# Patient Record
Sex: MALE | Race: BLACK OR AFRICAN AMERICAN | NOT HISPANIC OR LATINO | Employment: FULL TIME | URBAN - METROPOLITAN AREA
[De-identification: names, ages, dates, MRNs, and addresses within clinical notes are randomized per-mention and may not be internally consistent; named-entity substitution may affect disease eponyms.]

---

## 2017-04-05 ENCOUNTER — OFFICE VISIT (OUTPATIENT)
Dept: INTERNAL MEDICINE | Facility: CLINIC | Age: 27
End: 2017-04-05

## 2017-04-05 VITALS
SYSTOLIC BLOOD PRESSURE: 138 MMHG | HEART RATE: 74 BPM | HEIGHT: 69 IN | WEIGHT: 269 LBS | TEMPERATURE: 96.9 F | DIASTOLIC BLOOD PRESSURE: 91 MMHG | BODY MASS INDEX: 39.84 KG/M2 | OXYGEN SATURATION: 98 %

## 2017-04-05 DIAGNOSIS — M54.50 CHRONIC MIDLINE LOW BACK PAIN WITHOUT SCIATICA: ICD-10-CM

## 2017-04-05 DIAGNOSIS — E66.09 EXOGENOUS OBESITY: ICD-10-CM

## 2017-04-05 DIAGNOSIS — B00.9 HSV-2 INFECTION: ICD-10-CM

## 2017-04-05 DIAGNOSIS — G89.29 CHRONIC MIDLINE LOW BACK PAIN WITHOUT SCIATICA: ICD-10-CM

## 2017-04-05 DIAGNOSIS — H61.21 IMPACTED CERUMEN OF RIGHT EAR: ICD-10-CM

## 2017-04-05 DIAGNOSIS — H91.91 HEARING LOSS OF RIGHT EAR: Primary | ICD-10-CM

## 2017-04-05 DIAGNOSIS — F17.200 CURRENT EVERY DAY SMOKER: ICD-10-CM

## 2017-04-05 PROBLEM — F32.0 MILD SINGLE CURRENT EPISODE OF MAJOR DEPRESSIVE DISORDER (HCC): Status: ACTIVE | Noted: 2017-04-05

## 2017-04-05 PROCEDURE — 99406 BEHAV CHNG SMOKING 3-10 MIN: CPT | Performed by: INTERNAL MEDICINE

## 2017-04-05 PROCEDURE — 99214 OFFICE O/P EST MOD 30 MIN: CPT | Performed by: INTERNAL MEDICINE

## 2017-04-05 RX ORDER — VALACYCLOVIR HYDROCHLORIDE 1 G/1
1000 TABLET, FILM COATED ORAL 2 TIMES DAILY
Qty: 10 TABLET | Refills: 3 | Status: SHIPPED | OUTPATIENT
Start: 2017-04-05 | End: 2019-05-31

## 2017-04-05 RX ORDER — ERGOCALCIFEROL 1.25 MG/1
50000 CAPSULE ORAL WEEKLY
Qty: 4 CAPSULE | Refills: 11 | Status: SHIPPED | OUTPATIENT
Start: 2017-04-05 | End: 2018-04-05

## 2017-04-05 RX ORDER — BUPROPION HYDROCHLORIDE 100 MG/1
100 TABLET ORAL 3 TIMES DAILY
Qty: 270 TABLET | Refills: 1 | Status: SHIPPED | OUTPATIENT
Start: 2017-04-05 | End: 2019-05-31

## 2017-04-15 NOTE — PROGRESS NOTES
Subjective   Kennedy Meyer is a 26 y.o. male.   He is here today for hearing loss of right ear along with HSV-2 infection low back pain and obesity and current every day smoker as well as impacted cerumen of right ear  History of Present Illness   He is here today with hearing loss of right ear which turns out to be impacted cerumen along with HSV-2 infection low back pain obesity and current every day smoker  The following portions of the patient's history were reviewed and updated as appropriate: allergies, current medications, past family history, past medical history, past social history, past surgical history and problem list.    Review of Systems   HENT: Positive for hearing loss (right ear).    Musculoskeletal: Positive for back pain.   All other systems reviewed and are negative.      Objective   Physical Exam   Constitutional: He is oriented to person, place, and time. He appears well-developed and well-nourished. He is cooperative.   HENT:   Head: Normocephalic and atraumatic.   Right Ear: External ear and ear canal normal. There is drainage (impacted cerumen). Decreased hearing is noted.   Left Ear: Hearing, tympanic membrane, external ear and ear canal normal.   Nose: Nose normal.   Mouth/Throat: Uvula is midline, oropharynx is clear and moist and mucous membranes are normal.   Eyes: Conjunctivae, EOM and lids are normal. Pupils are equal, round, and reactive to light.   Neck: Phonation normal. Neck supple. Carotid bruit is not present.   Cardiovascular: Normal rate, regular rhythm and normal heart sounds.  Exam reveals no gallop and no friction rub.    No murmur heard.  Pulmonary/Chest: Effort normal and breath sounds normal. No respiratory distress.   Abdominal: Soft. Bowel sounds are normal. He exhibits no distension and no mass. There is no hepatosplenomegaly. There is no tenderness. There is no rebound and no guarding. No hernia.   Musculoskeletal: He exhibits no edema.        Lumbar back: He  exhibits pain.   Neurological: He is alert and oriented to person, place, and time. Coordination and gait normal.   Skin: Skin is warm and dry.   Psychiatric: He has a normal mood and affect. His speech is normal and behavior is normal. Judgment and thought content normal.   Nursing note and vitals reviewed.      Assessment/Plan   Diagnoses and all orders for this visit:    Hearing loss of right ear    HSV-2 infection    Chronic midline low back pain without sciatica    Exogenous obesity    Current every day smoker    Impacted cerumen of right ear  -     Ambulatory Referral to ENT (Otolaryngology)    Other orders  -     buPROPion (WELLBUTRIN) 100 MG tablet; Take 1 tablet by mouth 3 (Three) Times a Day.  -     valACYclovir (VALTREX) 1000 MG tablet; Take 1 tablet by mouth 2 (Two) Times a Day. As needed for herpes flare up      Hearing loss of right ear secondary to impacted cerumen we will have him see ENT  HSV-2 infection supportive meds  Low back pain supportive meds physical therapy  Obesity weight loss with proper diet exercise medication  Current every day smoker he is not ready quit smoking  Impacted cerumen of right ear follow-up with ENT

## 2019-05-31 ENCOUNTER — HOSPITAL ENCOUNTER (OUTPATIENT)
Dept: GENERAL RADIOLOGY | Facility: HOSPITAL | Age: 29
Discharge: HOME | End: 2019-05-31
Admitting: NURSE PRACTITIONER

## 2019-05-31 ENCOUNTER — OFFICE VISIT (OUTPATIENT)
Dept: INTERNAL MEDICINE | Facility: CLINIC | Age: 29
End: 2019-05-31

## 2019-05-31 VITALS
BODY MASS INDEX: 39.13 KG/M2 | SYSTOLIC BLOOD PRESSURE: 139 MMHG | OXYGEN SATURATION: 98 % | DIASTOLIC BLOOD PRESSURE: 89 MMHG | HEART RATE: 74 BPM | TEMPERATURE: 98 F | HEIGHT: 69 IN | WEIGHT: 264.2 LBS

## 2019-05-31 DIAGNOSIS — M25.532 LEFT WRIST PAIN: Primary | ICD-10-CM

## 2019-05-31 PROCEDURE — 73110 X-RAY EXAM OF WRIST: CPT

## 2019-05-31 PROCEDURE — 99213 OFFICE O/P EST LOW 20 MIN: CPT | Performed by: NURSE PRACTITIONER

## 2019-05-31 NOTE — PROGRESS NOTES
Subjective   Kennedy Meyer is a 28 y.o. male.   CC: Left wrist pain     Patient presents for evaluation of left wrist pain. This is a 28 YOM former patient of Dr. Lopez. He has a history of hypovitaminosis D, lumbar radiculopathy, and obesity. He presents complaining of left wrist pain and swelling x 1 month. He reports no mechanism of injury to the wrist and denies any falls. 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 He feels that the wrist is slightly swollen. He denies erythema or heat to the site. He denies numbness or tingling to the wrist or hand. He denies fever, chills, SOA, chest discomfort. He has taken nothing OTC for his symptoms. He denies development of any other new issues today.          The following portions of the patient's history were reviewed and updated as appropriate: allergies, current medications, past family history, past medical history, past social history, past surgical history and problem list.    Review of Systems   Constitutional: Negative for activity change, chills, fatigue, fever, unexpected weight gain and unexpected weight loss.   HENT: Negative for congestion, hearing loss, postnasal drip, sinus pressure, sneezing, sore throat and tinnitus.    Eyes: Negative for photophobia, pain and visual disturbance.   Respiratory: Negative for cough, chest tightness, shortness of breath and wheezing.    Cardiovascular: Negative for chest pain, palpitations and leg swelling.   Gastrointestinal: Negative for abdominal distention, abdominal pain, constipation, diarrhea, nausea and vomiting.   Endocrine: Negative for polydipsia, polyphagia and polyuria.   Genitourinary: Negative for dysuria, frequency, hematuria and urgency.   Musculoskeletal: Positive for arthralgias (  Left wrist pain, swelling) and joint swelling.   Neurological: Negative for dizziness, weakness, numbness and headache.   All  "other systems reviewed and are negative.      Objective    /89 (BP Location: Left arm, Patient Position: Sitting, Cuff Size: Large Adult)   Pulse 74   Temp 98 °F (36.7 °C) (Oral)   Ht 175.3 cm (69\")   Wt 120 kg (264 lb 3.2 oz)   SpO2 98%   BMI 39.02 kg/m²     Physical Exam   Constitutional: He is oriented to person, place, and time. He appears well-developed and well-nourished.   HENT:   Head: Normocephalic and atraumatic.   Eyes: Conjunctivae and EOM are normal. Pupils are equal, round, and reactive to light.   Neck: Normal range of motion. Neck supple.   Cardiovascular: Normal rate and regular rhythm.   Pulmonary/Chest: Effort normal and breath sounds normal.   Abdominal: Soft. Bowel sounds are normal. He exhibits no distension. There is no tenderness.   Musculoskeletal: Normal range of motion.        Left wrist: He exhibits tenderness and effusion.   Neurological: He is alert and oriented to person, place, and time.   Negative phalen test   Skin: Capillary refill takes less than 2 seconds.   Psychiatric: He has a normal mood and affect. His behavior is normal.   Nursing note and vitals reviewed.    Current outpatient and discharge medications have been reconciled for the patient.  Reviewed by: PETERSON Leigh      Assessment/Plan   Diagnoses and all orders for this visit:    Left wrist pain  -     XR Wrist 3+ View Left        - Left wrist pain: We will obtain xray of the left wrist for further evaluation. This pain has been going for about one month with no known cause of injury. If negative for fracture or acute bony abnormality, we will obtain CRP, ESR, uric acid and possibly provide methylprednisolone.     - He is going to consistently wear a cock-up splint at work and to bed to stabilize the wrist.     - Patient is going to make an apt for a CPE visit with fasting labs within the next few weeks. We will contact patient with the results of his imaging and any further recommendations. F/u PRN in " the meantime.

## 2019-07-12 ENCOUNTER — OFFICE VISIT (OUTPATIENT)
Dept: INTERNAL MEDICINE | Facility: CLINIC | Age: 29
End: 2019-07-12

## 2019-07-12 VITALS
TEMPERATURE: 97.9 F | RESPIRATION RATE: 16 BRPM | HEIGHT: 69 IN | WEIGHT: 261 LBS | BODY MASS INDEX: 38.66 KG/M2 | SYSTOLIC BLOOD PRESSURE: 144 MMHG | OXYGEN SATURATION: 96 % | HEART RATE: 82 BPM | DIASTOLIC BLOOD PRESSURE: 85 MMHG

## 2019-07-12 DIAGNOSIS — E55.9 AVITAMINOSIS D: ICD-10-CM

## 2019-07-12 DIAGNOSIS — M25.532 LEFT WRIST PAIN: Primary | ICD-10-CM

## 2019-07-12 DIAGNOSIS — Z00.00 HEALTHCARE MAINTENANCE: ICD-10-CM

## 2019-07-12 DIAGNOSIS — Z00.00 PHYSICAL EXAM: ICD-10-CM

## 2019-07-12 PROCEDURE — 99395 PREV VISIT EST AGE 18-39: CPT | Performed by: NURSE PRACTITIONER

## 2019-07-12 NOTE — PROGRESS NOTES
Subjective   Kennedy Meyer is a 28 y.o. male.   Chief Complaint   Patient presents with   • Annual Exam     Pt presents here today for an annual physical.   • Wrist Pain     Pt states his wrist has not changed. Pain is still the same.       Patient presents today for CPE and evaluation of left wrist pain.     He is a current smoker and states that he smokes about 4 cigarettes daily.  He is not interested in smoking cessation at this time.  He denies shortness of breath or chest discomfort.    He has a history of vitamin D deficiency, but states that he has never taken a supplement.  His last vitamin D level was low a couple of years ago.    He endorses continual left wrist pain.  He saw me for this issue on 5/31/2019 at which time I ordered an x-ray which was negative.  I recommended that he use cock-up splints, which he states he has, but the pain is not relenting despite the splints, so he has not been using them consistently anymore.  He states that he feels the wrist is a bit swollen, and he rates the pain at a 7 out of 10.  The pain does not radiate up the arm or down the hand.  He has seen no redness or felt any heat in the wrist or arm.    He denies development of any other new issues today.         The following portions of the patient's history were reviewed and updated as appropriate: allergies, current medications, past family history, past medical history, past social history, past surgical history and problem list.    Review of Systems   Constitutional: Negative for activity change, chills, fatigue, fever, unexpected weight gain and unexpected weight loss.   HENT: Negative for congestion, hearing loss, postnasal drip, sinus pressure, sneezing, sore throat and tinnitus.    Eyes: Negative for photophobia, pain and visual disturbance.   Respiratory: Negative for cough, chest tightness, shortness of breath and wheezing.    Cardiovascular: Negative for chest pain, palpitations and leg swelling.  "  Gastrointestinal: Negative for abdominal distention, abdominal pain, constipation, diarrhea, nausea and vomiting.   Endocrine: Negative for polydipsia, polyphagia and polyuria.   Genitourinary: Negative for dysuria, frequency, hematuria and urgency.   Musculoskeletal: Positive for arthralgias (  Left wrist pain).   Neurological: Negative for dizziness, weakness, numbness and headache.   All other systems reviewed and are negative.      Objective    /85 (BP Location: Left arm, Patient Position: Sitting, Cuff Size: Adult)   Pulse 82   Temp 97.9 °F (36.6 °C) (Oral)   Resp 16   Ht 175.3 cm (69\")   Wt 118 kg (261 lb)   SpO2 96%   BMI 38.54 kg/m²     Physical Exam   Constitutional: He is oriented to person, place, and time. He appears well-developed and well-nourished. No distress.   HENT:   Head: Normocephalic and atraumatic.   Right Ear: External ear normal.   Left Ear: External ear normal.   Nose: Nose normal.   Mouth/Throat: Oropharynx is clear and moist.   Eyes: Conjunctivae and EOM are normal. Pupils are equal, round, and reactive to light.   Neck: Normal range of motion. Neck supple. No JVD present. No tracheal deviation present. No thyromegaly present.   No carotid bruits auscultated   Cardiovascular: Normal rate, regular rhythm, normal heart sounds and intact distal pulses. Exam reveals no gallop and no friction rub.   No murmur heard.  Pulmonary/Chest: Effort normal and breath sounds normal. No stridor. No respiratory distress. He has no wheezes. He has no rales. He exhibits no tenderness.   Lungs are CTA bilaterally   Abdominal: Soft. Bowel sounds are normal. He exhibits no distension and no mass. There is no tenderness. There is no rebound and no guarding. No hernia.   Bowel sounds active x4 quadrants.  No pain to light or deep palpation x4 quadrants   Musculoskeletal: Normal range of motion. He exhibits tenderness. He exhibits no edema or deformity.   Left wrist tenderness to palpation.  No " mechanism of injury.  No heat or erythema.   Lymphadenopathy:     He has no cervical adenopathy.   Neurological: He is alert and oriented to person, place, and time.   Skin: Skin is warm and dry. Capillary refill takes less than 2 seconds. He is not diaphoretic.   Psychiatric: He has a normal mood and affect. His behavior is normal. Judgment and thought content normal.   Nursing note and vitals reviewed.    Current outpatient and discharge medications have been reconciled for the patient.  Reviewed by: PETERSON Leigh      Assessment/Plan   Kennedy was seen today for annual exam and wrist pain.    Diagnoses and all orders for this visit:    Left wrist pain  -     MRI wrist left wo contrast; Future  -     Rheumatoid Factor, Quant  -     Uric acid  -     C-reactive protein  -     BETI    Physical exam    Healthcare maintenance  -     Comprehensive metabolic panel  -     CBC & Differential  -     Lipid panel  -     Urinalysis With Microscopic - Urine, Clean Catch    Avitaminosis D  -     Vitamin D 25 Hydroxy      -CPE: We will get maintenance labs including lipid panel, microscopic UA, CBC and CMP.  Diet and exercise are discussed with patient for weight loss and to prevent cardiovascular events.    -Left wrist pain: X-ray was negative on 5/31/2018.  Cock-up splints have been ineffective.  NSAIDs have been ineffective.  We will get an MRI for further evaluation as well as draw a CRP, ESR, uric acid to rule out an underlying inflammatory disorder as the cause.    -Avitaminosis D: We will check a vitamin D level.  He is not taking a supplement at this time and we will recommend one if he is still low.    -We will contact patient with results of his labs and imaging and any further recommendations.  Follow-up PRN and in 6 months for routine health maintenance and follow-up on chronic conditions.

## 2019-07-15 ENCOUNTER — TELEPHONE (OUTPATIENT)
Dept: INTERNAL MEDICINE | Facility: CLINIC | Age: 29
End: 2019-07-15

## 2019-07-15 DIAGNOSIS — E55.9 HYPOVITAMINOSIS D: Primary | ICD-10-CM

## 2019-07-15 LAB
25(OH)D3+25(OH)D2 SERPL-MCNC: 12.9 NG/ML (ref 30–100)
ALBUMIN SERPL-MCNC: 4.8 G/DL (ref 3.5–5.2)
ALBUMIN/GLOB SERPL: 2.4 G/DL
ALP SERPL-CCNC: 86 U/L (ref 39–117)
ALT SERPL-CCNC: 24 U/L (ref 1–41)
ANA SER QL: NEGATIVE
APPEARANCE UR: CLEAR
AST SERPL-CCNC: 19 U/L (ref 1–40)
BACTERIA #/AREA URNS HPF: NORMAL /HPF
BASOPHILS # BLD AUTO: 0.04 10*3/MM3 (ref 0–0.2)
BASOPHILS NFR BLD AUTO: 0.6 % (ref 0–1.5)
BILIRUB SERPL-MCNC: 0.4 MG/DL (ref 0.2–1.2)
BILIRUB UR QL STRIP: NEGATIVE
BUN SERPL-MCNC: 8 MG/DL (ref 6–20)
BUN/CREAT SERPL: 9.8 (ref 7–25)
CALCIUM SERPL-MCNC: 9.4 MG/DL (ref 8.6–10.5)
CASTS URNS MICRO: NORMAL
CHLORIDE SERPL-SCNC: 103 MMOL/L (ref 98–107)
CHOLEST SERPL-MCNC: 161 MG/DL (ref 0–200)
CO2 SERPL-SCNC: 25.8 MMOL/L (ref 22–29)
COLOR UR: YELLOW
CREAT SERPL-MCNC: 0.82 MG/DL (ref 0.76–1.27)
CRP SERPL-MCNC: 0.56 MG/DL (ref 0–0.5)
EOSINOPHIL # BLD AUTO: 0.17 10*3/MM3 (ref 0–0.4)
EOSINOPHIL NFR BLD AUTO: 2.4 % (ref 0.3–6.2)
EPI CELLS #/AREA URNS HPF: NORMAL /HPF
ERYTHROCYTE [DISTWIDTH] IN BLOOD BY AUTOMATED COUNT: 11.9 % (ref 12.3–15.4)
GLOBULIN SER CALC-MCNC: 2 GM/DL
GLUCOSE SERPL-MCNC: 95 MG/DL (ref 65–99)
GLUCOSE UR QL: NEGATIVE
HCT VFR BLD AUTO: 43.1 % (ref 37.5–51)
HDLC SERPL-MCNC: 32 MG/DL (ref 40–60)
HGB BLD-MCNC: 14.8 G/DL (ref 13–17.7)
HGB UR QL STRIP: NEGATIVE
IMM GRANULOCYTES # BLD AUTO: 0.04 10*3/MM3 (ref 0–0.05)
IMM GRANULOCYTES NFR BLD AUTO: 0.6 % (ref 0–0.5)
KETONES UR QL STRIP: NEGATIVE
LDLC SERPL CALC-MCNC: 101 MG/DL (ref 0–100)
LEUKOCYTE ESTERASE UR QL STRIP: NEGATIVE
LYMPHOCYTES # BLD AUTO: 2.62 10*3/MM3 (ref 0.7–3.1)
LYMPHOCYTES NFR BLD AUTO: 36.5 % (ref 19.6–45.3)
MCH RBC QN AUTO: 33.3 PG (ref 26.6–33)
MCHC RBC AUTO-ENTMCNC: 34.3 G/DL (ref 31.5–35.7)
MCV RBC AUTO: 97.1 FL (ref 79–97)
MONOCYTES # BLD AUTO: 0.49 10*3/MM3 (ref 0.1–0.9)
MONOCYTES NFR BLD AUTO: 6.8 % (ref 5–12)
NEUTROPHILS # BLD AUTO: 3.82 10*3/MM3 (ref 1.7–7)
NEUTROPHILS NFR BLD AUTO: 53.1 % (ref 42.7–76)
NITRITE UR QL STRIP: NEGATIVE
NRBC BLD AUTO-RTO: 0 /100 WBC (ref 0–0.2)
PH UR STRIP: 6.5 [PH] (ref 5–8)
PLATELET # BLD AUTO: 240 10*3/MM3 (ref 140–450)
POTASSIUM SERPL-SCNC: 4.4 MMOL/L (ref 3.5–5.2)
PROT SERPL-MCNC: 6.8 G/DL (ref 6–8.5)
PROT UR QL STRIP: NEGATIVE
RBC # BLD AUTO: 4.44 10*6/MM3 (ref 4.14–5.8)
RBC #/AREA URNS HPF: NORMAL /HPF
RHEUMATOID FACT SERPL-ACNC: <10 IU/ML (ref 0–13.9)
SODIUM SERPL-SCNC: 140 MMOL/L (ref 136–145)
SP GR UR: 1.02 (ref 1–1.03)
TRIGL SERPL-MCNC: 141 MG/DL (ref 0–150)
URATE SERPL-MCNC: 5.2 MG/DL (ref 3.4–7)
UROBILINOGEN UR STRIP-MCNC: (no result) MG/DL
VLDLC SERPL CALC-MCNC: 28.2 MG/DL
WBC # BLD AUTO: 7.18 10*3/MM3 (ref 3.4–10.8)
WBC #/AREA URNS HPF: NORMAL /HPF

## 2019-07-15 RX ORDER — ERGOCALCIFEROL 1.25 MG/1
50000 CAPSULE ORAL
Qty: 6 CAPSULE | Refills: 0 | Status: SHIPPED | OUTPATIENT
Start: 2019-07-15 | End: 2020-01-14 | Stop reason: SDUPTHER

## 2019-07-15 NOTE — PROGRESS NOTES
Please notify patient that his uric acid, rheumatoid factor came back normal.  His C-reactive protein which is an inflammatory lab came back very slightly elevated.  His vitamin D is extremely low.  I am ordering 50,000 units once weekly of vitamin D for him to take, and would like to recheck his level in 2 months, please make lab appointment in 2 months.  CBC looks stable and metabolic panel is within normal limits.  Cholesterol is looking better as well.  I am sending him for the MRI to further investigate the soft tissues of his wrist/hand for the cause of his prolonged pain as we discussed in his visit.  Please let me know of any questions or concerns.

## 2019-07-31 ENCOUNTER — HOSPITAL ENCOUNTER (OUTPATIENT)
Dept: MRI IMAGING | Facility: HOSPITAL | Age: 29
Discharge: HOME | End: 2019-07-31
Admitting: NURSE PRACTITIONER

## 2019-07-31 DIAGNOSIS — T14.8XXA TENDON TEAR: Primary | ICD-10-CM

## 2019-07-31 DIAGNOSIS — M25.532 LEFT WRIST PAIN: ICD-10-CM

## 2019-07-31 PROCEDURE — 73221 MRI JOINT UPR EXTREM W/O DYE: CPT

## 2019-08-02 ENCOUNTER — OFFICE VISIT (OUTPATIENT)
Dept: ORTHOPEDIC SURGERY | Facility: CLINIC | Age: 29
End: 2019-08-02

## 2019-08-02 VITALS — BODY MASS INDEX: 39.07 KG/M2 | WEIGHT: 263.8 LBS | HEIGHT: 69 IN | TEMPERATURE: 97.5 F

## 2019-08-02 DIAGNOSIS — T14.8XXA TENDON TEAR: Primary | ICD-10-CM

## 2019-08-02 PROCEDURE — 99203 OFFICE O/P NEW LOW 30 MIN: CPT | Performed by: ORTHOPAEDIC SURGERY

## 2019-08-02 NOTE — PROGRESS NOTES
New Left Wrist      Patient: Kennedy Meyer        YOB: 1990        Chief Complaints:   Chief Complaint   Patient presents with   • Left Wrist - Establish Care, Pain     Left wrist pain    History of Present Illness: This is a 28-year-old patient who presents complaining of left wrist painPatient started I am going through this my this been ongoing for 2 no real history of injury that he can recall current symptoms are constant stabbing burning swelling worse with activity somewhat better with rest he is attack his past medical history is unremarkable months    HPI      Allergies: No Known Allergies    Medications:   Home Medications:  Current Outpatient Medications on File Prior to Visit   Medication Sig   • vitamin D (ERGOCALCIFEROL) 19553 units capsule capsule Take 1 capsule by mouth Every 7 (Seven) Days.     No current facility-administered medications on file prior to visit.      Current Medications:  Scheduled Meds:  Continuous Infusions:  No current facility-administered medications for this visit.   PRN Meds:.    No past medical history on file.     Past Surgical History:   Procedure Laterality Date   • LATERAL RECTUS RECESSION Right 11/06/2015   • TONSILLECTOMY AND ADENOIDECTOMY          Social History     Occupational History   • Not on file   Tobacco Use   • Smoking status: Current Every Day Smoker     Types: Electronic Cigarette   Substance and Sexual Activity   • Alcohol use: Yes     Comment: occasional   • Drug use: Not on file   • Sexual activity: Not on file    Social History     Social History Narrative   • Not on file        Family History   Problem Relation Age of Onset   • Diabetes Other         Borderline   • Hypertension Other              Review of Systems: 14 point review of systems remarkable for thumb pain only the remainder negative per the patient    Review of Systems      Physical Exam: 28 y.o. male  General Appearance:    Alert, cooperative, in no acute distress                  There were no vitals filed for this visit.   Patient is alert and read ×3 no acute distress appears her above-listed at height weight and age.  Affect is normal respiratory rate is normal unlabored. Heart rate regular rate rhythm, sclera, dentition and hearing are normal for the purpose of this exam.        Ortho Exam is exam of left hand he does have some tenderness with extension of his thumb he has full range of motion of his thumb no distinct palpable tenderness           Radiology:   AP, Lateral of the left wrist were reviewed on epic system these are normal he also has an MRI which shows synovitis and a partial extensor pollicis brevis tear    Assessment/Plan:    Partial extensor pollicis brevis tear this should respond to immobilization I will also start him on some meloxicam and some Voltaren gel if he fails to improve I will have him see Dr. Piedra

## 2019-08-05 ENCOUNTER — TELEPHONE (OUTPATIENT)
Dept: ORTHOPEDIC SURGERY | Facility: CLINIC | Age: 29
End: 2019-08-05

## 2019-08-05 DIAGNOSIS — T14.8XXA TENDON TEAR: Primary | ICD-10-CM

## 2019-08-05 RX ORDER — MELOXICAM 15 MG/1
TABLET ORAL
Qty: 30 TABLET | Refills: 3 | Status: SHIPPED | OUTPATIENT
Start: 2019-08-05 | End: 2020-01-13

## 2019-08-08 NOTE — TELEPHONE ENCOUNTER
Patient called stating he has been waiting 3 days for his medication and he wants to just go ahead and schedule surgery. He states that he is not sure how this cream will help a torn tendon and he would like to speak with ASHVIN in regards to this issue.

## 2019-08-09 NOTE — TELEPHONE ENCOUNTER
I am not convinced surgery immediately is the right thing however I will go on and have him see the hand surgeon Dr. Matthew snyder if you could put an order in for him to see Dr. Snyder thank you VADIM I sent the medication in twice

## 2019-08-09 NOTE — TELEPHONE ENCOUNTER
THE REASON WHY HE COULD NOT GET ONE RX FOR THE VOLTAREN GEL IS IT NEEDED A PA.   THIS HAS BEEN DONE AND APPROVED BY HIS INSURANCE.  HE SHOULD BE ABLE TO PICK THAT UP NOW.

## 2019-11-01 ENCOUNTER — TELEPHONE (OUTPATIENT)
Dept: ORTHOPEDIC SURGERY | Facility: CLINIC | Age: 29
End: 2019-11-01

## 2019-11-01 NOTE — TELEPHONE ENCOUNTER
Patient returned call and was given the advise from JUDY. Patient says he has not used the gel Rx since he ran out of the original script months ago.  I informed patient that ASHVIN will see message on Monday, 11/4 and we will call him back.

## 2019-11-01 NOTE — TELEPHONE ENCOUNTER
"Patient saw ASHVIN in August and was given Diclofenac gel. He has been using it and 2 weeks ago he noticed it has \"bleached\" his skin. There is no information about that being a side effect of the Rx. ALSO, since that was a delayed reaction he wonder if he should be worried about other symptoms such as \"liver pain\".  "

## 2019-11-01 NOTE — TELEPHONE ENCOUNTER
Left answering machine message for patient to call me back.  ASHVIN is gone for the day. Verbally per AMB, patient should stop the Rx and if symptoms worsen go see his PCP. ASHVIN won't see this message until Monday, 11/4.

## 2019-11-22 ENCOUNTER — OFFICE VISIT (OUTPATIENT)
Dept: INTERNAL MEDICINE | Facility: CLINIC | Age: 29
End: 2019-11-22

## 2019-11-22 VITALS
DIASTOLIC BLOOD PRESSURE: 86 MMHG | OXYGEN SATURATION: 97 % | BODY MASS INDEX: 38.54 KG/M2 | WEIGHT: 261 LBS | HEART RATE: 83 BPM | SYSTOLIC BLOOD PRESSURE: 136 MMHG | TEMPERATURE: 97.7 F

## 2019-11-22 DIAGNOSIS — K59.00 CONSTIPATION, UNSPECIFIED CONSTIPATION TYPE: Primary | ICD-10-CM

## 2019-11-22 PROCEDURE — 99213 OFFICE O/P EST LOW 20 MIN: CPT | Performed by: NURSE PRACTITIONER

## 2020-01-07 ENCOUNTER — TELEPHONE (OUTPATIENT)
Dept: INTERNAL MEDICINE | Facility: CLINIC | Age: 30
End: 2020-01-07

## 2020-01-13 ENCOUNTER — OFFICE VISIT (OUTPATIENT)
Dept: INTERNAL MEDICINE | Facility: CLINIC | Age: 30
End: 2020-01-13

## 2020-01-13 VITALS
BODY MASS INDEX: 36.58 KG/M2 | WEIGHT: 247 LBS | HEART RATE: 78 BPM | TEMPERATURE: 98 F | RESPIRATION RATE: 16 BRPM | SYSTOLIC BLOOD PRESSURE: 132 MMHG | DIASTOLIC BLOOD PRESSURE: 85 MMHG | OXYGEN SATURATION: 100 % | HEIGHT: 69 IN

## 2020-01-13 DIAGNOSIS — Z00.00 HEALTHCARE MAINTENANCE: ICD-10-CM

## 2020-01-13 DIAGNOSIS — K59.09 CHRONIC CONSTIPATION: ICD-10-CM

## 2020-01-13 DIAGNOSIS — F17.200 CURRENT EVERY DAY SMOKER: ICD-10-CM

## 2020-01-13 DIAGNOSIS — G89.29 CHRONIC MIDLINE LOW BACK PAIN WITHOUT SCIATICA: ICD-10-CM

## 2020-01-13 DIAGNOSIS — Z13.220 LIPID SCREENING: ICD-10-CM

## 2020-01-13 DIAGNOSIS — M54.50 CHRONIC MIDLINE LOW BACK PAIN WITHOUT SCIATICA: ICD-10-CM

## 2020-01-13 DIAGNOSIS — E55.9 AVITAMINOSIS D: Primary | ICD-10-CM

## 2020-01-13 LAB
25(OH)D3+25(OH)D2 SERPL-MCNC: 18.7 NG/ML (ref 30–100)
ALBUMIN SERPL-MCNC: 4.8 G/DL (ref 3.5–5.2)
ALBUMIN/GLOB SERPL: 1.8 G/DL
ALP SERPL-CCNC: 84 U/L (ref 39–117)
ALT SERPL-CCNC: 21 U/L (ref 1–41)
AST SERPL-CCNC: 16 U/L (ref 1–40)
BASOPHILS # BLD AUTO: 0.05 10*3/MM3 (ref 0–0.2)
BASOPHILS NFR BLD AUTO: 0.6 % (ref 0–1.5)
BILIRUB SERPL-MCNC: 0.4 MG/DL (ref 0.2–1.2)
BUN SERPL-MCNC: 7 MG/DL (ref 6–20)
BUN/CREAT SERPL: 7.4 (ref 7–25)
CALCIUM SERPL-MCNC: 9.9 MG/DL (ref 8.6–10.5)
CHLORIDE SERPL-SCNC: 101 MMOL/L (ref 98–107)
CHOLEST SERPL-MCNC: 138 MG/DL (ref 0–200)
CO2 SERPL-SCNC: 27.4 MMOL/L (ref 22–29)
CREAT SERPL-MCNC: 0.94 MG/DL (ref 0.76–1.27)
EOSINOPHIL # BLD AUTO: 0.03 10*3/MM3 (ref 0–0.4)
EOSINOPHIL NFR BLD AUTO: 0.3 % (ref 0.3–6.2)
ERYTHROCYTE [DISTWIDTH] IN BLOOD BY AUTOMATED COUNT: 12.1 % (ref 12.3–15.4)
GLOBULIN SER CALC-MCNC: 2.7 GM/DL
GLUCOSE SERPL-MCNC: 93 MG/DL (ref 65–99)
HCT VFR BLD AUTO: 45.9 % (ref 37.5–51)
HDLC SERPL-MCNC: 35 MG/DL (ref 40–60)
HGB BLD-MCNC: 15.6 G/DL (ref 13–17.7)
IMM GRANULOCYTES # BLD AUTO: 0.05 10*3/MM3 (ref 0–0.05)
IMM GRANULOCYTES NFR BLD AUTO: 0.6 % (ref 0–0.5)
LDLC SERPL CALC-MCNC: 80 MG/DL (ref 0–100)
LYMPHOCYTES # BLD AUTO: 1.93 10*3/MM3 (ref 0.7–3.1)
LYMPHOCYTES NFR BLD AUTO: 22.1 % (ref 19.6–45.3)
MCH RBC QN AUTO: 33.6 PG (ref 26.6–33)
MCHC RBC AUTO-ENTMCNC: 34 G/DL (ref 31.5–35.7)
MCV RBC AUTO: 98.9 FL (ref 79–97)
MONOCYTES # BLD AUTO: 0.49 10*3/MM3 (ref 0.1–0.9)
MONOCYTES NFR BLD AUTO: 5.6 % (ref 5–12)
NEUTROPHILS # BLD AUTO: 6.18 10*3/MM3 (ref 1.7–7)
NEUTROPHILS NFR BLD AUTO: 70.8 % (ref 42.7–76)
NRBC BLD AUTO-RTO: 0 /100 WBC (ref 0–0.2)
PLATELET # BLD AUTO: 257 10*3/MM3 (ref 140–450)
POTASSIUM SERPL-SCNC: 4.6 MMOL/L (ref 3.5–5.2)
PROT SERPL-MCNC: 7.5 G/DL (ref 6–8.5)
RBC # BLD AUTO: 4.64 10*6/MM3 (ref 4.14–5.8)
SODIUM SERPL-SCNC: 142 MMOL/L (ref 136–145)
TRIGL SERPL-MCNC: 116 MG/DL (ref 0–150)
VLDLC SERPL CALC-MCNC: 23.2 MG/DL
WBC # BLD AUTO: 8.73 10*3/MM3 (ref 3.4–10.8)

## 2020-01-13 PROCEDURE — 99214 OFFICE O/P EST MOD 30 MIN: CPT | Performed by: NURSE PRACTITIONER

## 2020-01-13 RX ORDER — LINACLOTIDE 72 UG/1
CAPSULE, GELATIN COATED ORAL
COMMUNITY
Start: 2020-01-08 | End: 2020-01-13

## 2020-01-13 NOTE — PROGRESS NOTES
"Subjective   Kennedy Meyer is a 29 y.o. male.   CC: 6-month follow-up, low vitamin D, chronic constipation    Patient presents for 6-month follow-up.  This is a 29-year-old male.    He has a history of hypovitaminosis D.  He has taken 50,000 units weekly, prescription strength vitamin D in the past.  Currently not on a vitamin D supplement.    He has issues with chronic constipation.  He has been taking Linzess 72 mcg daily.  He reports that when he does not take it, he does not have a bowel movement at all and his chronic constipation causes abdominal discomfort.  When he does take the Linzess however, he often has diarrhea throughout the day.  He reports that he does not want to go off of the medication because when he does not take it he does not have a bowel movement for \"days on end.\"  He is taking a probiotic and a fiber supplement daily as well.  He has tried to increase the amount of fiber rich foods in his diet as well.    He is a current smoker, 2 to 3 packs/week.    He refuses a flu shot.  He denies development of any other new issues today.       The following portions of the patient's history were reviewed and updated as appropriate: allergies, current medications, past family history, past medical history, past social history, past surgical history and problem list.    Review of Systems   Constitutional: Negative for activity change, chills, fatigue, fever, unexpected weight gain and unexpected weight loss.   HENT: Negative for congestion, hearing loss, postnasal drip, sinus pressure, sneezing, sore throat, swollen glands and tinnitus.    Eyes: Negative for photophobia, pain and visual disturbance.   Respiratory: Negative for cough, chest tightness, shortness of breath and wheezing.    Cardiovascular: Negative for chest pain, palpitations and leg swelling.   Gastrointestinal: Positive for constipation and diarrhea. Negative for abdominal distention, abdominal pain, nausea and vomiting.   Endocrine: " "Negative for polydipsia, polyphagia and polyuria.   Genitourinary: Negative for dysuria, frequency, hematuria and urgency.   Neurological: Negative for dizziness, weakness, numbness and headache.   All other systems reviewed and are negative.      Objective    /85 (BP Location: Left arm, Patient Position: Sitting, Cuff Size: Adult)   Pulse 78   Temp 98 °F (36.7 °C) (Oral)   Resp 16   Ht 175.3 cm (69\")   Wt 112 kg (247 lb)   SpO2 100%   BMI 36.48 kg/m²     Physical Exam   Constitutional: He is oriented to person, place, and time. He appears well-developed and well-nourished. No distress.   HENT:   Head: Normocephalic and atraumatic.   Eyes: Pupils are equal, round, and reactive to light. EOM are normal.   Neck: Normal range of motion. Neck supple.   Cardiovascular: Normal rate, regular rhythm, normal heart sounds and intact distal pulses. Exam reveals no gallop and no friction rub.   No murmur heard.  Pulmonary/Chest: Effort normal and breath sounds normal. No stridor. No respiratory distress. He has no wheezes. He has no rales. He exhibits no tenderness.   Lungs are CTA bilaterally   Abdominal: Soft. Bowel sounds are normal. He exhibits no distension. There is no tenderness.   Musculoskeletal: Normal range of motion.   Neurological: He is alert and oriented to person, place, and time.   Skin: Skin is warm and dry. Capillary refill takes less than 2 seconds. He is not diaphoretic.   Psychiatric: He has a normal mood and affect. His behavior is normal. Judgment and thought content normal.   Nursing note and vitals reviewed.    Current outpatient and discharge medications have been reconciled for the patient.  Reviewed by: PETERSON Leigh      Assessment/Plan   Kennedy was seen today for follow-up.    Diagnoses and all orders for this visit:    Avitaminosis D  -     Vitamin D 25 Hydroxy    Chronic midline low back pain without sciatica    Current every day smoker  -     CBC & Differential  -     " Comprehensive metabolic panel    Chronic constipation  -     CBC & Differential  -     Comprehensive metabolic panel  -     Ambulatory Referral to Gastroenterology    Healthcare maintenance    Lipid screening  -     Lipid panel      -Hypovitaminosis D: We will check a vitamin D level today and adjust therapy if needed based on labs.    -Chronic low back pain: Stable at this time.  He may take Tylenol for discomfort.    -Current smoker: 2 to 3 packs/week.  He expresses that he is not ready to discuss cessation at this time.    -Chronic constipation: Currently taking Linzess.  This does often give him diarrhea, but without it he reports that he does not have a bowel movement at all therefore wants to stay on it.  We will get him to GI for further recommendations.  I have written him a work note explaining that he goes to the bathroom multiple times per day.  He reports that it is possible for him to work from home and would like to if he can.    -We will check CBC, CMP, lipid panel and vitamin D today.  We will contact patient with results of his labs and any further recommendations.  Follow-up PRN and I will see him back in 6 months.

## 2020-01-14 ENCOUNTER — TELEPHONE (OUTPATIENT)
Dept: INTERNAL MEDICINE | Facility: CLINIC | Age: 30
End: 2020-01-14

## 2020-01-14 DIAGNOSIS — E55.9 HYPOVITAMINOSIS D: ICD-10-CM

## 2020-01-14 RX ORDER — ERGOCALCIFEROL 1.25 MG/1
50000 CAPSULE ORAL
Qty: 8 CAPSULE | Refills: 1 | Status: SHIPPED | OUTPATIENT
Start: 2020-01-14 | End: 2020-07-08

## 2020-01-14 NOTE — PROGRESS NOTES
Please notify patient that blood count looks stable with normal hemoglobin.  Metabolic panel looks perfect.  Cholesterol panel looks stable.  Vitamin D is very low at 18.7.  Please order 50,000 units once weekly x8 weeks of vitamin D and a repeat vitamin D level for 2 months.

## 2020-01-14 NOTE — TELEPHONE ENCOUNTER
----- Message from PETERSON Leigh sent at 1/14/2020  8:12 AM EST -----  Please notify patient that blood count looks stable with normal hemoglobin.  Metabolic panel looks perfect.  Cholesterol panel looks stable.  Vitamin D is very low at 18.7.  Please order 50,000 units once weekly x8 weeks of vitamin D and a repeat vit  amin D level for 2 months.

## 2020-02-06 ENCOUNTER — TELEPHONE (OUTPATIENT)
Dept: INTERNAL MEDICINE | Facility: CLINIC | Age: 30
End: 2020-02-06

## 2020-02-06 NOTE — TELEPHONE ENCOUNTER
Patient called requesting Rx linzess to be sent to his pharmacy.    linaclotide (LINZESS) 72 MCG capsule capsule    Please advise.  Call back: [phone]    Pharmacy:[facility] [address] [place] [address] [address] [facility] AT [facility] [address]

## 2020-02-24 ENCOUNTER — OFFICE VISIT (OUTPATIENT)
Dept: GASTROENTEROLOGY | Facility: CLINIC | Age: 30
End: 2020-02-24

## 2020-02-24 ENCOUNTER — HOSPITAL ENCOUNTER (OUTPATIENT)
Dept: GENERAL RADIOLOGY | Facility: HOSPITAL | Age: 30
Discharge: HOME | End: 2020-02-24
Admitting: INTERNAL MEDICINE

## 2020-02-24 VITALS
SYSTOLIC BLOOD PRESSURE: 142 MMHG | HEIGHT: 69 IN | WEIGHT: 246.3 LBS | BODY MASS INDEX: 36.48 KG/M2 | DIASTOLIC BLOOD PRESSURE: 90 MMHG | TEMPERATURE: 97.9 F

## 2020-02-24 DIAGNOSIS — K59.09 OTHER CONSTIPATION: ICD-10-CM

## 2020-02-24 DIAGNOSIS — R10.84 GENERALIZED ABDOMINAL PAIN: Primary | ICD-10-CM

## 2020-02-24 DIAGNOSIS — R10.84 GENERALIZED ABDOMINAL PAIN: ICD-10-CM

## 2020-02-24 PROCEDURE — 74018 RADEX ABDOMEN 1 VIEW: CPT

## 2020-02-24 PROCEDURE — 99204 OFFICE O/P NEW MOD 45 MIN: CPT | Performed by: INTERNAL MEDICINE

## 2020-02-24 NOTE — PROGRESS NOTES
Chief Complaint   Patient presents with   • Abdominal Pain   • Constipation       Subjective     HPI    Kennedy Meyer is a 29 y.o. male with a past medical history noted below who presents for evaluation of abdominal pain and constipation.  Symptoms started in November last year.  He was having abdominal pain.  Generalized in location, cramping, and having a full sensation in his abdomen.  Felt he was not having BMs, going up to 4 days without BMs.  He saw his primary care physician who put him on Linzess 72 mcg.  He has been taking this consistently.  When he does not take it, he does not feel that he has bowel movements.  However when he does take it, it gives him about 3-5 loose stools.  He finds that he will have to stay at home until he finishes all the bowel movements before he can go to work in the morning.    He takes MiraLAX occasionally, he says only when he has not had a bowel movement in a number of days.    No imaging has been done, he says he is still paying off an MRI of his wrist.    No family history of GI malignancies or inflammatory bowel disease.    No abdominal surgeries.    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 No pertinent past medical history.      Current Outpatient Medications:   •  linaclotide (LINZESS) 72 MCG capsule capsule, Take 1 capsule by mouth Every Morning Before Breakfast., Disp: 30 capsule, Rfl: 2  •  Multiple Vitamins-Minerals (CENTRUM MEN PO), Take  by mouth., Disp: , Rfl:   •  Probiotic Product (PROBIOTIC DAILY PO), Take  by mouth., Disp: , Rfl:   •  vitamin D (ERGOCALCIFEROL) 1.25 MG (98120 UT) capsule capsule, Take 1 capsule by mouth Every 7 (Seven) Days., Disp: 8 capsule, Rfl: 1    No Known Allergies    Social History     Socioeconomic History   • Marital status: Single     Spouse name: Not on file   • Number of children: Not on file   • Years of education: Not on file   • Highest education level: Not on file   Tobacco Use    • Smoking status: Current Every Day Smoker     Packs/day: 0.25     Years: 5.00     Pack years: 1.25     Types: Cigarettes, Electronic Cigarette   • Smokeless tobacco: Never Used   Substance and Sexual Activity   • Alcohol use: Yes     Comment: Two drinks a month if so.   • Drug use: No   • Sexual activity: Yes     Partners: Female     Birth control/protection: Condom       Family History   Problem Relation Age of Onset   • Diabetes Other         Borderline   • Hypertension Other        Review of Systems   Constitutional: Negative for activity change, appetite change, fatigue and fever.   HENT: Negative for sore throat and trouble swallowing.    Respiratory: Negative.    Cardiovascular: Negative.    Gastrointestinal: Positive for abdominal pain and constipation. Negative for abdominal distention and blood in stool.   Endocrine: Negative for cold intolerance and heat intolerance.   Genitourinary: Negative for difficulty urinating, dysuria and frequency.   Musculoskeletal: Negative for arthralgias, back pain and myalgias.   Skin: Negative.    Neurological: Negative for headaches.   Hematological: Negative for adenopathy. Does not bruise/bleed easily.   All other systems reviewed and are negative.      Objective     Vitals:    02/24/20 0840   BP: 142/90   Temp: 97.9 °F (36.6 °C)         02/24/20  0840   Weight: 112 kg (246 lb 4.8 oz)     Body mass index is 36.37 kg/m².    Physical Exam   Constitutional: He is oriented to person, place, and time. He appears well-developed and well-nourished. No distress.   HENT:   Head: Normocephalic and atraumatic.   Right Ear: External ear normal.   Left Ear: External ear normal.   Nose: Nose normal.   Mouth/Throat: Oropharynx is clear and moist.   Eyes: Conjunctivae and EOM are normal. Right eye exhibits no discharge. Left eye exhibits no discharge. No scleral icterus.   Neck: Normal range of motion. Neck supple. No thyromegaly present.   No supraclavicular adenopathy    Cardiovascular: Normal rate, regular rhythm, normal heart sounds and intact distal pulses. Exam reveals no gallop.   No murmur heard.  No lower extremity edema   Pulmonary/Chest: Effort normal and breath sounds normal. No respiratory distress. He has no wheezes.   Abdominal: Soft. Normal appearance and bowel sounds are normal. He exhibits no distension and no mass. There is no hepatosplenomegaly. There is no tenderness. There is no rigidity, no rebound and no guarding. No hernia.   Genitourinary:   Genitourinary Comments: Rectal exam deferred   Musculoskeletal: Normal range of motion. He exhibits no edema or tenderness.   No atrophy of upper or lower extremities.  Normal digits and nails of both hands.   Lymphadenopathy:     He has no cervical adenopathy.   Neurological: He is alert and oriented to person, place, and time. He displays no atrophy. Coordination normal.   Skin: Skin is warm and dry. No rash noted. He is not diaphoretic. No erythema.   Psychiatric: He has a normal mood and affect. His behavior is normal. Judgment and thought content normal.   Vitals reviewed.      WBC   Date Value Ref Range Status   01/13/2020 8.73 3.40 - 10.80 10*3/mm3 Final     RBC   Date Value Ref Range Status   01/13/2020 4.64 4.14 - 5.80 10*6/mm3 Final     Hemoglobin   Date Value Ref Range Status   01/13/2020 15.6 13.0 - 17.7 g/dL Final     Hematocrit   Date Value Ref Range Status   01/13/2020 45.9 37.5 - 51.0 % Final     MCV   Date Value Ref Range Status   01/13/2020 98.9 (H) 79.0 - 97.0 fL Final     MCH   Date Value Ref Range Status   01/13/2020 33.6 (H) 26.6 - 33.0 pg Final     MCHC   Date Value Ref Range Status   01/13/2020 34.0 31.5 - 35.7 g/dL Final     RDW   Date Value Ref Range Status   01/13/2020 12.1 (L) 12.3 - 15.4 % Final     Platelets   Date Value Ref Range Status   01/13/2020 257 140 - 450 10*3/mm3 Final     Neutrophil Rel %   Date Value Ref Range Status   01/13/2020 70.8 42.7 - 76.0 % Final     Lymphocyte Rel %    Date Value Ref Range Status   01/13/2020 22.1 19.6 - 45.3 % Final     Monocyte Rel %   Date Value Ref Range Status   01/13/2020 5.6 5.0 - 12.0 % Final     Eosinophil Rel %   Date Value Ref Range Status   01/13/2020 0.3 0.3 - 6.2 % Final     Basophil Rel %   Date Value Ref Range Status   01/13/2020 0.6 0.0 - 1.5 % Final     Neutrophils Absolute   Date Value Ref Range Status   01/13/2020 6.18 1.70 - 7.00 10*3/mm3 Final     Lymphocytes Absolute   Date Value Ref Range Status   01/13/2020 1.93 0.70 - 3.10 10*3/mm3 Final     Monocytes Absolute   Date Value Ref Range Status   01/13/2020 0.49 0.10 - 0.90 10*3/mm3 Final     Eosinophils Absolute   Date Value Ref Range Status   01/13/2020 0.03 0.00 - 0.40 10*3/mm3 Final     Basophils Absolute   Date Value Ref Range Status   01/13/2020 0.05 0.00 - 0.20 10*3/mm3 Final     nRBC   Date Value Ref Range Status   01/13/2020 0.0 0.0 - 0.2 /100 WBC Final       Sodium   Date Value Ref Range Status   01/13/2020 142 136 - 145 mmol/L Final     Potassium   Date Value Ref Range Status   01/13/2020 4.6 3.5 - 5.2 mmol/L Final     Total CO2   Date Value Ref Range Status   01/13/2020 27.4 22.0 - 29.0 mmol/L Final     Chloride   Date Value Ref Range Status   01/13/2020 101 98 - 107 mmol/L Final     Creatinine   Date Value Ref Range Status   01/13/2020 0.94 0.76 - 1.27 mg/dL Final     BUN   Date Value Ref Range Status   01/13/2020 7 6 - 20 mg/dL Final     BUN/Creatinine Ratio   Date Value Ref Range Status   01/13/2020 7.4 7.0 - 25.0 Final     Calcium   Date Value Ref Range Status   01/13/2020 9.9 8.6 - 10.5 mg/dL Final     eGFR Non  Am   Date Value Ref Range Status   01/13/2020 94 >60 mL/min/1.73 Final     Alkaline Phosphatase   Date Value Ref Range Status   01/13/2020 84 39 - 117 U/L Final     ALT (SGPT)   Date Value Ref Range Status   01/13/2020 21 1 - 41 U/L Final     AST (SGOT)   Date Value Ref Range Status   01/13/2020 16 1 - 40 U/L Final     Total Bilirubin   Date Value Ref Range  Status   01/13/2020 0.4 0.2 - 1.2 mg/dL Final     Albumin   Date Value Ref Range Status   01/13/2020 4.80 3.50 - 5.20 g/dL Final     A/G Ratio   Date Value Ref Range Status   01/13/2020 1.8 g/dL Final         Imaging Results (Last 7 Days)     ** No results found for the last 168 hours. **            No notes on file    Assessment/Plan    Generalized abdominal pain: Along with his constipation.  I think this is secondary to distention from the constipation.  I would like to get more detailed imaging but he is reluctant to pursue a CAT scan    Constipation: The low-dose Linzess is working too well and giving him excessive diarrhea stools    Plan  KUB for further evaluation of constipation; will try to get that today.  Ideally I would like to get a CAT scan but he is still paying off imaging from a wrist injury last year    Advised changing to daily or twice daily miralax and saving the linzess only for more refractory episodes of constipation.      Kennedy was seen today for abdominal pain and constipation.    Diagnoses and all orders for this visit:    Generalized abdominal pain  -     XR Abdomen KUB; Future    Other constipation  -     XR Abdomen KUB; Future        I have discussed the above plan with the patient.  They verbalize understanding and are in agreement with the plan.  They have been advised to contact the office for any questions, concerns, or changes related to their health.    Dictated utilizing Dragon dictation

## 2020-03-03 ENCOUNTER — TELEPHONE (OUTPATIENT)
Dept: GASTROENTEROLOGY | Facility: CLINIC | Age: 30
End: 2020-03-03

## 2020-03-03 ENCOUNTER — OFFICE VISIT (OUTPATIENT)
Dept: GASTROENTEROLOGY | Facility: CLINIC | Age: 30
End: 2020-03-03

## 2020-03-03 VITALS
BODY MASS INDEX: 37.12 KG/M2 | WEIGHT: 250.6 LBS | TEMPERATURE: 98.5 F | HEIGHT: 69 IN | DIASTOLIC BLOOD PRESSURE: 84 MMHG | SYSTOLIC BLOOD PRESSURE: 136 MMHG

## 2020-03-03 DIAGNOSIS — R10.30 LOWER ABDOMINAL PAIN: Primary | ICD-10-CM

## 2020-03-03 DIAGNOSIS — K59.00 CONSTIPATION, UNSPECIFIED CONSTIPATION TYPE: ICD-10-CM

## 2020-03-03 PROCEDURE — 99214 OFFICE O/P EST MOD 30 MIN: CPT | Performed by: NURSE PRACTITIONER

## 2020-03-03 NOTE — TELEPHONE ENCOUNTER
----- Message from Jennifer Dumont MD sent at 2/26/2020 11:53 AM EST -----  KUB shows normal colon, no constipation nor obstruction.

## 2020-03-03 NOTE — PROGRESS NOTES
Chief Complaint   Patient presents with   • Abdominal Pain   • Constipation       Kennedy Meyer is a  29 y.o. male here for a follow up visit for constipation.    HPI  29-year-old male presents today for follow-up visit for lower abdominal pain and constipation.  He is a patient of Dr. Deras.  He was last seen in the office on 2/24/2020.  He tells me his constipation really started months ago when he gave up eating red meat.  He has been taking MiraLAX over-the-counter once to 2 times a day and this has not really been working well at all.  He got really constipated over the weekend and went back to taking Linzess 72 and he admits this really does work but it does give him lots of gas and diarrhea.  He denies any dysphagia, reflux, nausea and vomiting, rectal bleeding or melena.  Admits his appetite is good and his weight is stable.  He recently had a KUB done which was completely normal.  He has never had an EGD or screening colonoscopy.  Patient is taking a daily fiber supplement.  History reviewed. No pertinent past medical history.    Past Surgical History:   Procedure Laterality Date   • LATERAL RECTUS RECESSION Right 11/06/2015   • TONSILLECTOMY AND ADENOIDECTOMY         Scheduled Meds:    Continuous Infusions:  No current facility-administered medications for this visit.     PRN Meds:.    No Known Allergies    Social History     Socioeconomic History   • Marital status: Single     Spouse name: Not on file   • Number of children: Not on file   • Years of education: Not on file   • Highest education level: Not on file   Tobacco Use   • Smoking status: Current Every Day Smoker     Packs/day: 0.25     Years: 5.00     Pack years: 1.25     Types: Cigarettes, Electronic Cigarette   • Smokeless tobacco: Never Used   Substance and Sexual Activity   • Alcohol use: Yes     Comment: Two drinks a month if so.   • Drug use: No   • Sexual activity: Yes     Partners: Female     Birth control/protection: Condom       Family  History   Problem Relation Age of Onset   • Diabetes Other         Borderline   • Hypertension Other        Review of Systems   Constitutional: Negative for appetite change, chills, diaphoresis, fatigue, fever and unexpected weight change.   HENT: Negative for nosebleeds, postnasal drip, sore throat, trouble swallowing and voice change.    Respiratory: Negative for cough, choking, chest tightness, shortness of breath and wheezing.    Cardiovascular: Negative for chest pain, palpitations and leg swelling.   Gastrointestinal: Positive for abdominal distention and constipation. Negative for abdominal pain, anal bleeding, blood in stool, diarrhea, nausea, rectal pain and vomiting.   Endocrine: Negative for polydipsia, polyphagia and polyuria.   Musculoskeletal: Negative for gait problem.   Skin: Negative for rash and wound.   Allergic/Immunologic: Negative for food allergies.   Neurological: Negative for dizziness, speech difficulty and light-headedness.   Psychiatric/Behavioral: Negative for confusion, self-injury, sleep disturbance and suicidal ideas.       Vitals:    03/03/20 0859   BP: 136/84   Temp: 98.5 °F (36.9 °C)       Physical Exam   Constitutional: He is oriented to person, place, and time. He appears well-developed and well-nourished. He does not appear ill. No distress.   HENT:   Head: Normocephalic.   Eyes: Pupils are equal, round, and reactive to light.   Cardiovascular: Normal rate, regular rhythm and normal heart sounds.   Pulmonary/Chest: Effort normal and breath sounds normal.   Abdominal: Soft. Bowel sounds are normal. He exhibits distension. He exhibits no mass. There is no hepatosplenomegaly. There is no tenderness. There is no rebound and no guarding. No hernia.   Musculoskeletal: Normal range of motion.   Neurological: He is alert and oriented to person, place, and time.   Skin: Skin is warm and dry.   Psychiatric: He has a normal mood and affect. His speech is normal and behavior is normal.  Judgment normal.       Xr Abdomen Kub    Result Date: 2/24/2020  Negative.  This report was finalized on 2/24/2020 11:30 AM by Dr. Jovanni Leslie M.D.      Assessment and plan      1. Lower abdominal pain    2. Constipation, unspecified constipation type    Reviewed results of KUB with him today.  It was negative.  Sounds like MiraLAX is not working well for him.  Linzess 72 works well but may be a little too aggressive?  We will go ahead and give him some samples of Amitiza 24 to trial at home.  Continue daily fiber supplement.  Patient to call the office next week with an update.  Patient to follow-up with me or Dr. Dumont in June as planned.

## 2020-03-06 ENCOUNTER — TELEPHONE (OUTPATIENT)
Dept: GASTROENTEROLOGY | Facility: CLINIC | Age: 30
End: 2020-03-06

## 2020-03-06 NOTE — TELEPHONE ENCOUNTER
----- Message from Kennedy Meyer sent at 3/6/2020 12:55 AM EST -----  Regarding: Non-Urgent Medical Question  Contact: [phone]  Look at the poop attachment. Why is this happening?

## 2020-03-06 NOTE — TELEPHONE ENCOUNTER
Please call the patient and see what all is going on with him?  What is he currently taking for his bowel regimen?  Let him know that any of the pictures that he thought he uploaded did not cross over.  How are his bowels moving at this point?

## 2020-03-06 NOTE — TELEPHONE ENCOUNTER
----- Message from Kennedy Meyer sent at 3/6/2020  1:32 AM EST -----  Regarding: Visit Follow-Up Question  Contact: [phone]  Sharp pain shooting from my stomach to my rectum. Constant cramps or stomach pain.

## 2020-03-06 NOTE — TELEPHONE ENCOUNTER
----- Message from Kennedy Meyer sent at 3/6/2020 12:53 AM EST -----  Regarding: Visit Follow-Up Question  Contact: [phone]  It's 1am. Can't sleep. Feels like someone is pulling my at my groin.

## 2020-03-11 ENCOUNTER — TELEPHONE (OUTPATIENT)
Dept: INTERNAL MEDICINE | Facility: CLINIC | Age: 30
End: 2020-03-11

## 2020-03-13 ENCOUNTER — OFFICE (OUTPATIENT)
Dept: URBAN - METROPOLITAN AREA CLINIC 75 | Facility: CLINIC | Age: 30
End: 2020-03-13
Payer: COMMERCIAL

## 2020-03-13 VITALS
WEIGHT: 245 LBS | SYSTOLIC BLOOD PRESSURE: 134 MMHG | HEIGHT: 69 IN | HEART RATE: 82 BPM | DIASTOLIC BLOOD PRESSURE: 90 MMHG

## 2020-03-13 DIAGNOSIS — K59.00 CONSTIPATION, UNSPECIFIED: ICD-10-CM

## 2020-03-13 DIAGNOSIS — K21.9 GASTRO-ESOPHAGEAL REFLUX DISEASE WITHOUT ESOPHAGITIS: ICD-10-CM

## 2020-03-13 PROCEDURE — 99203 OFFICE O/P NEW LOW 30 MIN: CPT | Performed by: INTERNAL MEDICINE

## 2020-03-13 PROCEDURE — 99243 OFF/OP CNSLTJ NEW/EST LOW 30: CPT | Performed by: INTERNAL MEDICINE

## 2020-03-13 RX ORDER — LUBIPROSTONE 24 UG/1
48 CAPSULE, GELATIN COATED ORAL
Qty: 60 | Refills: 6 | Status: COMPLETED
Start: 2020-03-13 | End: 2020-05-28

## 2020-04-21 ENCOUNTER — TELEPHONE (OUTPATIENT)
Dept: GASTROENTEROLOGY | Facility: CLINIC | Age: 30
End: 2020-04-21

## 2020-04-21 NOTE — TELEPHONE ENCOUNTER
----- Message from Kennedy eMyer sent at 4/21/2020  1:36 PM EDT -----  Regarding: Referral Request  Contact: [phone]  I'd like to quit smoking cigarettes. Can you provide me a prescription ?

## 2020-04-23 ENCOUNTER — OFFICE VISIT (OUTPATIENT)
Dept: INTERNAL MEDICINE | Facility: CLINIC | Age: 30
End: 2020-04-23

## 2020-04-23 DIAGNOSIS — Z71.6 ENCOUNTER FOR SMOKING CESSATION COUNSELING: Primary | ICD-10-CM

## 2020-04-23 DIAGNOSIS — F17.200 CURRENT SMOKER: ICD-10-CM

## 2020-04-23 PROCEDURE — 99442 PR PHYS/QHP TELEPHONE EVALUATION 11-20 MIN: CPT | Performed by: NURSE PRACTITIONER

## 2020-04-23 RX ORDER — VARENICLINE TARTRATE 0.5 MG/1
TABLET, FILM COATED ORAL
Qty: 319 TABLET | Refills: 0 | Status: SHIPPED | OUTPATIENT
Start: 2020-04-23 | End: 2020-06-24 | Stop reason: SDUPTHER

## 2020-04-23 NOTE — PATIENT INSTRUCTIONS
Varenicline oral tablets  What is this medicine?  VARENICLINE (ellis e NI kleen) is used to help people quit smoking. It is used with a patient support program recommended by your physician.  This medicine may be used for other purposes; ask your health care provider or pharmacist if you have questions.  COMMON BRAND NAME(S): Erika  What should I tell my health care provider before I take this medicine?  They need to know if you have any of these conditions:  -heart disease  -if you often drink alcohol  -kidney disease  -mental illness  -on hemodialysis  -seizures  -history of stroke  -suicidal thoughts, plans, or attempt; a previous suicide attempt by you or a family member  -an unusual or allergic reaction to varenicline, other medicines, foods, dyes, or preservatives  -pregnant or trying to get pregnant  -breast-feeding  How should I use this medicine?  Take this medicine by mouth after eating. Take with a full glass of water. Follow the directions on the prescription label. Take your doses at regular intervals. Do not take your medicine more often than directed.  There are 3 ways you can use this medicine to help you quit smoking; talk to your health care professional to decide which plan is right for you:  1) you can choose a quit date and start this medicine 1 week before the quit date, or,  2) you can start taking this medicine before you choose a quit date, and then pick a quit date between day 8 and 35 days of treatment, or,  3) if you are not sure that you are able or willing to quit smoking right away, start taking this medicine and slowly decrease the amount you smoke as directed by your health care professional with the goal of being cigarette-free by week 12 of treatment.  Stick to your plan; ask about support groups or other ways to help you remain cigarette-free. If you are motivated to quit smoking and did not succeed during a previous attempt with this medicine for reasons other than side effects,  or if you returned to smoking after this treatment, speak with your health care professional about whether another course of this medicine may be right for you.  A special MedGuide will be given to you by the pharmacist with each prescription and refill. Be sure to read this information carefully each time.  Talk to your pediatrician regarding the use of this medicine in children. This medicine is not approved for use in children.  Overdosage: If you think you have taken too much of this medicine contact a poison control center or emergency room at once.  NOTE: This medicine is only for you. Do not share this medicine with others.  What if I miss a dose?  If you miss a dose, take it as soon as you can. If it is almost time for your next dose, take only that dose. Do not take double or extra doses.  What may interact with this medicine?  -alcohol  -insulin  -other medicines used to help people quit smoking  -theophylline  -warfarin  This list may not describe all possible interactions. Give your health care provider a list of all the medicines, herbs, non-prescription drugs, or dietary supplements you use. Also tell them if you smoke, drink alcohol, or use illegal drugs. Some items may interact with your medicine.  What should I watch for while using this medicine?  It is okay if you do not succeed at your attempt to quit and have a cigarette. You can still continue your quit attempt and keep using this medicine as directed. Just throw away your cigarettes and get back to your quit plan.  Talk to your health care provider before using other treatments to quit smoking. Using this medicine with other treatments to quit smoking may increase the risk for side effects compared to using a treatment alone.  You may get drowsy or dizzy. Do not drive, use machinery, or do anything that needs mental alertness until you know how this medicine affects you. Do not stand or sit up quickly, especially if you are an older patient.  "This reduces the risk of dizzy or fainting spells.  Decrease the number of alcoholic beverages that you drink during treatment with this medicine until you know if this medicine affects your ability to tolerate alcohol. Some people have experienced increased drunkenness (intoxication), unusual or sometimes aggressive behavior, or no memory of things that have happened (amnesia) during treatment with this medicine.  Sleepwalking can happen during treatment with this medicine, and can sometimes lead to behavior that is harmful to you, other people, or property. Stop taking this medicine and tell your doctor if you start sleepwalking or have other unusual sleep-related activity.  After taking this medicine, you may get up out of bed and do an activity that you do not know you are doing. The next morning, you may have no memory of this. Activities include driving a car (\"sleep-driving\"), making and eating food, talking on the phone, sexual activity, and sleep-walking. Serious injuries have occurred. Stop the medicine and call your doctor right away if you find out you have done any of these activities. Do not take this medicine if you have used alcohol that evening. Do not take it if you have taken another medicine for sleep. The risk of doing these sleep-related activities is higher.  Patients and their families should watch out for new or worsening depression or thoughts of suicide. Also watch out for sudden changes in feelings such as feeling anxious, agitated, panicky, irritable, hostile, aggressive, impulsive, severely restless, overly excited and hyperactive, or not being able to sleep. If this happens, call your health care professional.  If you have diabetes and you quit smoking, the effects of insulin may be increased and you may need to reduce your insulin dose. Check with your doctor or health care professional about how you should adjust your insulin dose.  What side effects may I notice from receiving this " medicine?  Side effects that you should report to your doctor or health care professional as soon as possible:  -allergic reactions like skin rash, itching or hives, swelling of the face, lips, tongue, or throat  -acting aggressive, being angry or violent, or acting on dangerous impulses  -breathing problems  -changes in emotions or moods  -chest pain or chest tightness  -feeling faint or lightheaded, falls  -hallucination, loss of contact with reality  -mouth sores  -redness, blistering, peeling or loosening of the skin, including inside the mouth  -signs and symptoms of a stroke like changes in vision; confusion; trouble speaking or understanding; severe headaches; sudden numbness or weakness of the face, arm or leg; trouble walking; dizziness; loss of balance or coordination  -seizures  -sleepwalking  -suicidal thoughts or other mood changes  Side effects that usually do not require medical attention (report to your doctor or health care professional if they continue or are bothersome):  -constipation  -gas  -headache  -nausea, vomiting  -strange dreams  -trouble sleeping  This list may not describe all possible side effects. Call your doctor for medical advice about side effects. You may report side effects to FDA at 3-752-FDA-5024.  Where should I keep my medicine?  Keep out of the reach of children.  Store at room temperature between 15 and 30 degrees C (59 and 86 degrees F). Throw away any unused medicine after the expiration date.  NOTE: This sheet is a summary. It may not cover all possible information. If you have questions about this medicine, talk to your doctor, pharmacist, or health care provider.  © 2020 [employer] (2019-12-06 14:27:36)

## 2020-04-23 NOTE — PROGRESS NOTES
Subjective   Kennedy Meyer is a 29 y.o. male.   Chief Complaint   Patient presents with   • Nicotine Dependence     would like to quit smoking   You have chosen to receive care through a telephone visit. Do you consent to use a telephone visit for your medical care today? Yes      Patient presents for discussion regarding smoking cessation options via telephone visit.  This is a 29-year-old male.  He has smoked 1/4 pack/day of cigarettes since age 15 (about 14 years).  He states that he is ready to quit and would like to discuss options to do so.  He has tried going cold turkey from cigarettes before but has never been successful in quitting long-term.  He reports that he is committed to quitting smoking.  He denies development of any other new issues today.       The following portions of the patient's history were reviewed and updated as appropriate: allergies, current medications, past family history, past medical history, past social history, past surgical history and problem list.    Review of Systems   Constitutional: Negative for activity change, chills, fatigue, fever, unexpected weight gain and unexpected weight loss.   HENT: Negative for congestion, hearing loss, postnasal drip, sinus pressure, sneezing, sore throat, swollen glands and tinnitus.    Eyes: Negative for photophobia, pain and visual disturbance.   Respiratory: Negative for cough, chest tightness, shortness of breath and wheezing.    Cardiovascular: Negative for chest pain, palpitations and leg swelling.   Gastrointestinal: Negative for abdominal distention, abdominal pain, constipation, diarrhea, nausea and vomiting.   Endocrine: Negative for polydipsia, polyphagia and polyuria.   Genitourinary: Negative for dysuria, frequency, hematuria and urgency.   Neurological: Negative for dizziness, weakness, numbness and headache.   All other systems reviewed and are negative.      Objective    There were no vitals taken for this visit.    Physical  Exam   Constitutional: He is oriented to person, place, and time.   Neurological: He is oriented to person, place, and time.   Psychiatric: Thought content normal.     Current outpatient and discharge medications have been reconciled for the patient.  Reviewed by: PETERSON Leigh      Assessment/Plan   Kennedy was seen today for nicotine dependence.    Diagnoses and all orders for this visit:    Encounter for smoking cessation counseling  -     varenicline (Chantix) 0.5 MG tablet; Take 1 tablet by mouth Daily for 3 days, THEN 1 tablet 2 (Two) Times a Day for 4 days, THEN 2 tablets 2 (Two) Times a Day for 77 days.    Current smoker  -     varenicline (Chantix) 0.5 MG tablet; Take 1 tablet by mouth Daily for 3 days, THEN 1 tablet 2 (Two) Times a Day for 4 days, THEN 2 tablets 2 (Two) Times a Day for 77 days.      -Discussed options for smoking cessation including Wellbutrin, nicotine patches and Chantix.  After discussion patient would like to try Chantix.  Discussed potential side effects.  We set a quit date for him for 4/30/2020, 1 week from now.  He will begin the medication today with 0.5 mg daily x3 days, 0.5 mg twice daily days 4-7, then 1 mg twice daily x11 weeks.  He has a follow-up in place in 3 months.  At that time he will be nearing the end of Chantix and we will discuss his success and whether to elongate the therapy for an additional 12 weeks.    -Follow-up PRN and I will see him back in July 2020.    This visit was conducted via telephone due to COVID-19 pandemic therefore no physical exam was performed.    This visit has been rescheduled as a phone visit to comply with patient safety concerns in accordance with CDC recommendations. Total time of discussion was 15 minutes.

## 2020-05-28 VITALS — HEIGHT: 69 IN | WEIGHT: 245 LBS

## 2020-05-29 ENCOUNTER — OFFICE (OUTPATIENT)
Dept: URBAN - METROPOLITAN AREA CLINIC 75 | Facility: CLINIC | Age: 30
End: 2020-05-29

## 2020-05-29 DIAGNOSIS — K59.00 CONSTIPATION, UNSPECIFIED: ICD-10-CM

## 2020-05-29 DIAGNOSIS — R10.84 GENERALIZED ABDOMINAL PAIN: ICD-10-CM

## 2020-05-29 DIAGNOSIS — K21.9 GASTRO-ESOPHAGEAL REFLUX DISEASE WITHOUT ESOPHAGITIS: ICD-10-CM

## 2020-05-29 PROCEDURE — 99213 OFFICE O/P EST LOW 20 MIN: CPT | Mod: 95 | Performed by: INTERNAL MEDICINE

## 2020-05-29 RX ORDER — LINACLOTIDE 72 UG/1
CAPSULE, GELATIN COATED ORAL
Qty: 30 | Refills: 6 | Status: ACTIVE
Start: 2020-05-29

## 2020-06-09 ENCOUNTER — OFFICE VISIT (OUTPATIENT)
Dept: GASTROENTEROLOGY | Facility: CLINIC | Age: 30
End: 2020-06-09

## 2020-06-09 VITALS — HEIGHT: 69 IN | BODY MASS INDEX: 36.29 KG/M2 | TEMPERATURE: 97.9 F | WEIGHT: 245 LBS

## 2020-06-09 DIAGNOSIS — R93.3 ABNORMAL CT SCAN, COLON: ICD-10-CM

## 2020-06-09 DIAGNOSIS — K59.00 CONSTIPATION, UNSPECIFIED CONSTIPATION TYPE: ICD-10-CM

## 2020-06-09 DIAGNOSIS — R10.30 LOWER ABDOMINAL PAIN: Primary | ICD-10-CM

## 2020-06-09 PROCEDURE — 99214 OFFICE O/P EST MOD 30 MIN: CPT | Performed by: NURSE PRACTITIONER

## 2020-06-09 NOTE — PROGRESS NOTES
Chief Complaint   Patient presents with   • Follow-up   • Abdominal Pain       Kennedy Meyer is a  29 y.o. male here for a follow up visit for abdominal pain.    HPI  29-year-old male presents today for follow-up visit for abdominal pain and constipation.  He is a patient of Dr. Dumont.  He was last seen in the office on 3/3/2020.  Recently he was in the [place] [facility] this past Saturday for worsening abdominal pain.  He had a CT scan of the abdomen and pelvis done that showed some possible colonic wall thickening possibly due to colitis?  There was also some retained stool as well.  Patient was given some IV fluids and IV pain meds.  He has a history of chronic constipation and admits he is not doing well on Linzess 72 mcg daily.  He admits he just causes watery diarrhea.  He feels like he is never really emptying his bowels.  He tells me his abdominal pain is been much worse since he ate 3-4 all beef patties over the [facility] holiday.  He tells me since then his bowels have been so messed up.  He tells me he is having so much abdominal pain and cramping.  So because of that he is really avoided all red meat since his hospital stay.  He thinks he is tried Amitiza in the past he does not remember it working as well.  He does not believe he is tried Trulance.  He tells me MiraLAX over-the-counter did not work well.  He has never had a screening colonoscopy before.  He denies any significant GI family history at this time.  He denies any dysphagia, reflux, nausea and vomiting, rectal bleeding or melena.  Admits his appetite is good and his weight is stable.            .History reviewed. No pertinent past medical history.    Past Surgical History:   Procedure Laterality Date   • LATERAL RECTUS RECESSION Right 11/06/2015   • TONSILLECTOMY AND ADENOIDECTOMY         Scheduled Meds:    Continuous Infusions:  No current facility-administered medications for this visit.     PRN Meds:.    No Known Allergies    Social History      Socioeconomic History   • Marital status: Single     Spouse name: Not on file   • Number of children: Not on file   • Years of education: Not on file   • Highest education level: Not on file   Tobacco Use   • Smoking status: Current Every Day Smoker     Packs/day: 0.25     Years: 5.00     Pack years: 1.25     Types: Cigarettes, Electronic Cigarette   • Smokeless tobacco: Never Used   Substance and Sexual Activity   • Alcohol use: Yes     Comment: Two drinks a month if so.   • Drug use: No   • Sexual activity: Yes     Partners: Female     Birth control/protection: Condom       Family History   Problem Relation Age of Onset   • Diabetes Other         Borderline   • Hypertension Other        Review of Systems   Constitutional: Negative for appetite change, chills, diaphoresis, fatigue, fever and unexpected weight change.   HENT: Negative for nosebleeds, postnasal drip, sore throat, trouble swallowing and voice change.    Respiratory: Negative for cough, choking, chest tightness, shortness of breath and wheezing.    Cardiovascular: Negative for chest pain, palpitations and leg swelling.   Gastrointestinal: Positive for abdominal distention, abdominal pain and diarrhea. Negative for anal bleeding, blood in stool, constipation, nausea, rectal pain and vomiting.   Endocrine: Negative for polydipsia, polyphagia and polyuria.   Musculoskeletal: Negative for gait problem.   Skin: Negative for rash and wound.   Allergic/Immunologic: Negative for food allergies.   Neurological: Negative for dizziness, speech difficulty and light-headedness.   Psychiatric/Behavioral: Negative for confusion, self-injury, sleep disturbance and suicidal ideas.       Vitals:    06/09/20 1501   Temp: 97.9 °F (36.6 °C)       Physical Exam   Constitutional: He is oriented to person, place, and time. He appears well-developed and well-nourished. He does not appear ill. No distress.   HENT:   Head: Normocephalic.   Eyes: Pupils are equal, round, and  reactive to light.   Cardiovascular: Normal rate, regular rhythm and normal heart sounds.   Pulmonary/Chest: Effort normal and breath sounds normal.   Abdominal: Soft. Bowel sounds are normal. He exhibits distension. He exhibits no mass. There is no hepatosplenomegaly. There is no tenderness. There is no rebound and no guarding. No hernia.   Musculoskeletal: Normal range of motion.   Neurological: He is alert and oriented to person, place, and time.   Skin: Skin is warm and dry.   Psychiatric: He has a normal mood and affect. His speech is normal and behavior is normal. Judgment normal.       No radiology results for the last 7 days     Assessment and plan     1. Lower abdominal pain  - Case Request; Standing  - Case Request    2. Constipation, unspecified constipation type  - Case Request; Standing  - Case Request    3. Abnormal CT scan, colon  - Case Request; Standing  - Case Request    I reviewed his records with him at [facility].  I think given his history and continued symptoms along with his abnormal CT scan he should schedule colonoscopy with Dr. Dumont for further evaluation.  Patient is agreeable to the scope.  For now can go ahead and have him stop the Linzess and trial true Lucas instead.  1 tab daily.  Patient needs to increase his water.  Patient needs to really work on cutting out the marijuana.  I think he would do well to avoid a lot of red meat.  Since it seems like that is what started all this.  Patient to call the office with any issues.  Patient to follow-up with me after his scope.

## 2020-06-18 ENCOUNTER — TELEPHONE (OUTPATIENT)
Dept: GASTROENTEROLOGY | Facility: CLINIC | Age: 30
End: 2020-06-18

## 2020-06-18 NOTE — TELEPHONE ENCOUNTER
----- Message from Kennedy Meyer sent at 6/18/2020 11:34 AM EDT -----  Regarding: Test Results Question  Contact: [phone]  Good morning katlin, this morning I went and had my cat scan with dye at [facility], once I get results I'm gonna have them fwd to your offices as well.

## 2020-06-23 ENCOUNTER — TELEPHONE (OUTPATIENT)
Dept: GASTROENTEROLOGY | Facility: CLINIC | Age: 30
End: 2020-06-23

## 2020-06-23 NOTE — TELEPHONE ENCOUNTER
----- Message from Roma Noel Rep sent at 6/23/2020  3:03 PM EDT -----  Regarding: ct scan  Contact: [phone]  Pt is calling about his ct scan he had done at Dr Cerrato office, he did says that he is seeing 2 gastro dr right now, Tim and Pineda from [facility].

## 2020-06-23 NOTE — TELEPHONE ENCOUNTER
Called pt and pt reports he is currently seeing 2 GI doctors to help figure out what is going on with him.   He reports he had a ct scan done on 06/18 at [facility].  He states that Dr Sung ordered the ct scan. He states they will not call him back and he is asking if Dr Dumont can take a look at his ct scan. Advised pt we will try and get the results and send message to Dr Dumont. Pt verb understanding.     Called Gatito Daniel at [phone] and requested ct scan results be faxed to us at [phone].

## 2020-06-23 NOTE — TELEPHONE ENCOUNTER
Ct scan received form [facility] and scanned under media tab. Dr Dumont notified.

## 2020-06-29 ENCOUNTER — TELEPHONE (OUTPATIENT)
Dept: GASTROENTEROLOGY | Facility: CLINIC | Age: 30
End: 2020-06-29

## 2020-06-29 NOTE — TELEPHONE ENCOUNTER
"Call to pt.   has had BM's over the weekend - feels fine today.      Taking linzess QAM.   generally has \"full feeling\" in stomach.  Has not yet eaten today, and feels full.   has wakened in night to have BM @ 3am.  Feels like stomach never empties.     Update to M Monica.   "

## 2020-06-29 NOTE — TELEPHONE ENCOUNTER
----- Message from Kennedy Meyer sent at 6/29/2020 12:48 PM EDT -----  Regarding: Non-Urgent Medical Question  Contact: [phone]  My colonoscopy is July 6th, what day should I call to schedule COVID testing? Or when should I test for COVID?

## 2020-06-29 NOTE — TELEPHONE ENCOUNTER
Call to pt.  Advise that will be contacted by [facility] re: covid testing.  Will complete 4 days prior.  May also contact [phone] or [phone] to arrange.  Verb understanding.

## 2020-06-29 NOTE — TELEPHONE ENCOUNTER
Have him keep his planned colonoscopy with Dr. Dumont and let she see what the results show.  For now continue what he is doing.  Call us with any further issues.

## 2020-06-29 NOTE — TELEPHONE ENCOUNTER
----- Message from Kennedy Meyer sent at 6/26/2020  6:49 PM EDT -----  Regarding: Complaint  Contact: [phone]  Absolute blockage. No vowel movements without linzess and it's all water.

## 2020-06-30 ENCOUNTER — TRANSCRIBE ORDERS (OUTPATIENT)
Dept: SLEEP MEDICINE | Facility: HOSPITAL | Age: 30
End: 2020-06-30

## 2020-06-30 DIAGNOSIS — Z01.818 OTHER SPECIFIED PRE-OPERATIVE EXAMINATION: Primary | ICD-10-CM

## 2020-07-03 ENCOUNTER — LAB (OUTPATIENT)
Dept: LAB | Facility: HOSPITAL | Age: 30
End: 2020-07-03

## 2020-07-03 DIAGNOSIS — Z01.818 OTHER SPECIFIED PRE-OPERATIVE EXAMINATION: ICD-10-CM

## 2020-07-03 PROCEDURE — U0004 COV-19 TEST NON-CDC HGH THRU: HCPCS

## 2020-07-03 PROCEDURE — C9803 HOPD COVID-19 SPEC COLLECT: HCPCS

## 2020-07-04 LAB
REF LAB TEST METHOD: NORMAL
SARS-COV-2 RNA RESP QL NAA+PROBE: NOT DETECTED

## 2020-07-06 ENCOUNTER — ANESTHESIA (OUTPATIENT)
Dept: GASTROENTEROLOGY | Facility: HOSPITAL | Age: 30
End: 2020-07-06

## 2020-07-06 ENCOUNTER — ANESTHESIA EVENT (OUTPATIENT)
Dept: GASTROENTEROLOGY | Facility: HOSPITAL | Age: 30
End: 2020-07-06

## 2020-07-06 ENCOUNTER — HOSPITAL ENCOUNTER (OUTPATIENT)
Facility: HOSPITAL | Age: 30
Setting detail: HOSPITAL OUTPATIENT SURGERY
Discharge: HOME | End: 2020-07-06
Attending: INTERNAL MEDICINE | Admitting: INTERNAL MEDICINE

## 2020-07-06 VITALS
DIASTOLIC BLOOD PRESSURE: 69 MMHG | HEART RATE: 67 BPM | WEIGHT: 248.3 LBS | OXYGEN SATURATION: 99 % | RESPIRATION RATE: 18 BRPM | HEIGHT: 69 IN | TEMPERATURE: 98.5 F | SYSTOLIC BLOOD PRESSURE: 121 MMHG | BODY MASS INDEX: 36.78 KG/M2

## 2020-07-06 DIAGNOSIS — K59.00 CONSTIPATION, UNSPECIFIED CONSTIPATION TYPE: ICD-10-CM

## 2020-07-06 DIAGNOSIS — R10.30 LOWER ABDOMINAL PAIN: ICD-10-CM

## 2020-07-06 DIAGNOSIS — R93.3 ABNORMAL CT SCAN, COLON: ICD-10-CM

## 2020-07-06 PROCEDURE — 25010000002 PROPOFOL 10 MG/ML EMULSION: Performed by: ANESTHESIOLOGY

## 2020-07-06 PROCEDURE — 45380 COLONOSCOPY AND BIOPSY: CPT | Performed by: INTERNAL MEDICINE

## 2020-07-06 PROCEDURE — 88305 TISSUE EXAM BY PATHOLOGIST: CPT | Performed by: INTERNAL MEDICINE

## 2020-07-06 RX ORDER — PROPOFOL 10 MG/ML
VIAL (ML) INTRAVENOUS AS NEEDED
Status: DISCONTINUED | OUTPATIENT
Start: 2020-07-06 | End: 2020-07-06 | Stop reason: SURG

## 2020-07-06 RX ORDER — LIDOCAINE HYDROCHLORIDE 20 MG/ML
INJECTION, SOLUTION INFILTRATION; PERINEURAL AS NEEDED
Status: DISCONTINUED | OUTPATIENT
Start: 2020-07-06 | End: 2020-07-06 | Stop reason: SURG

## 2020-07-06 RX ORDER — PROPOFOL 10 MG/ML
VIAL (ML) INTRAVENOUS CONTINUOUS PRN
Status: DISCONTINUED | OUTPATIENT
Start: 2020-07-06 | End: 2020-07-06 | Stop reason: SURG

## 2020-07-06 RX ORDER — SODIUM CHLORIDE, SODIUM LACTATE, POTASSIUM CHLORIDE, CALCIUM CHLORIDE 600; 310; 30; 20 MG/100ML; MG/100ML; MG/100ML; MG/100ML
1000 INJECTION, SOLUTION INTRAVENOUS CONTINUOUS
Status: DISCONTINUED | OUTPATIENT
Start: 2020-07-06 | End: 2020-07-06 | Stop reason: HOSPADM

## 2020-07-06 RX ADMIN — LIDOCAINE HYDROCHLORIDE 60 MG: 20 INJECTION, SOLUTION INFILTRATION; PERINEURAL at 16:04

## 2020-07-06 RX ADMIN — SODIUM CHLORIDE, POTASSIUM CHLORIDE, SODIUM LACTATE AND CALCIUM CHLORIDE 1000 ML: 600; 310; 30; 20 INJECTION, SOLUTION INTRAVENOUS at 15:29

## 2020-07-06 RX ADMIN — PROPOFOL 125 MCG/KG/MIN: 10 INJECTION, EMULSION INTRAVENOUS at 16:05

## 2020-07-06 RX ADMIN — PROPOFOL 75 MG: 10 INJECTION, EMULSION INTRAVENOUS at 16:05

## 2020-07-06 NOTE — ANESTHESIA PREPROCEDURE EVALUATION
Anesthesia Evaluation     Patient summary reviewed and Nursing notes reviewed   no history of anesthetic complications:  NPO Solid Status: > 8 hours  NPO Liquid Status: > 2 hours           Airway   Mallampati: II  TM distance: >3 FB  Neck ROM: full  No difficulty expected  Dental - normal exam     Pulmonary - normal exam    breath sounds clear to auscultation  (+) a smoker (Quit May 2020) Former, shortness of breath,   Cardiovascular - negative cardio ROS and normal exam    Rhythm: regular  Rate: normal        Neuro/Psych  (+) numbness, psychiatric history Depression,     GI/Hepatic/Renal/Endo    (+) obesity,     (-) morbid obesity    Musculoskeletal     (+) radiculopathy  Abdominal   (+) obese,    Substance History - negative use     OB/GYN negative ob/gyn ROS         Other   arthritis (osteo),                      Anesthesia Plan    ASA 2     MAC     intravenous induction     Anesthetic plan, all risks, benefits, and alternatives have been provided, discussed and informed consent has been obtained with: patient.

## 2020-07-07 NOTE — ANESTHESIA POSTPROCEDURE EVALUATION
"Patient: Kennedy Meyer    Procedure Summary     Date:  07/06/20 Room / Location:  [facility] END[facility]COPY 10 / [facility]    Anesthesia Start:  1601 Anesthesia Stop:  1635    Procedure:  COLONOSCOPY to cecum and TI with cold polypectomy (N/A ) Diagnosis:       Lower abdominal pain      Constipation, unspecified constipation type      Abnormal CT scan, colon      (Lower abdominal pain [R10.30])      (Constipation, unspecified constipation type [K59.00])      (Abnormal CT scan, colon [R93.3])    Surgeon:  Jennifer Dumont MD Provider:  Angel Young MD    Anesthesia Type:  MAC ASA Status:  2          Anesthesia Type: MAC    Vitals  Vitals Value Taken Time   /69 7/6/2020  4:54 PM   Temp     Pulse 67 7/6/2020  4:54 PM   Resp 18 7/6/2020  4:54 PM   SpO2 99 % 7/6/2020  4:54 PM           Post Anesthesia Care and Evaluation    Patient location during evaluation: bedside  Patient participation: complete - patient participated  Level of consciousness: awake and alert  Pain management: adequate  Airway patency: patent  Anesthetic complications: No anesthetic complications    Cardiovascular status: acceptable  Respiratory status: acceptable  Hydration status: acceptable    Comments: /69 (BP Location: Left arm, Patient Position: Lying)   Pulse 67   Temp 36.9 °C (98.5 °F) (Oral)   Resp 18   Ht 175.3 cm (69\")   Wt 113 kg (248 lb 4.8 oz)   SpO2 99%   BMI 36.67 kg/m²       "

## 2020-07-08 DIAGNOSIS — E55.9 HYPOVITAMINOSIS D: ICD-10-CM

## 2020-07-08 LAB
CYTO UR: NORMAL
LAB AP CASE REPORT: NORMAL
PATH REPORT.FINAL DX SPEC: NORMAL
PATH REPORT.GROSS SPEC: NORMAL

## 2020-07-08 RX ORDER — ERGOCALCIFEROL 1.25 MG/1
CAPSULE ORAL
Qty: 4 CAPSULE | Refills: 0 | Status: SHIPPED | OUTPATIENT
Start: 2020-07-08 | End: 2020-08-21 | Stop reason: SDUPTHER

## 2020-07-09 ENCOUNTER — TELEPHONE (OUTPATIENT)
Dept: GASTROENTEROLOGY | Facility: CLINIC | Age: 30
End: 2020-07-09

## 2020-07-09 NOTE — TELEPHONE ENCOUNTER
----- Message from Kennedy Meyer sent at 7/9/2020  9:17 AM EDT -----  Regarding: Test Results Question  Contact: [phone]  Good morning, thank you Dr Dumont, for taking care of me on Monday.  I truly appreciate the assistance I've received from your office over these last few months.

## 2020-07-09 NOTE — PROGRESS NOTES
All of the random colon biopsies showed normal tissue.  No evidence of inflammation or significant change    The colon polyp was a tubular adenoma.  In the absence of symptoms, his next colonoscopy should be in 5 years, please place in recall.    Follow-up with Kerry in 6 to 8 weeks, thanks

## 2020-07-16 ENCOUNTER — TELEPHONE (OUTPATIENT)
Dept: GASTROENTEROLOGY | Facility: CLINIC | Age: 30
End: 2020-07-16

## 2020-07-16 NOTE — TELEPHONE ENCOUNTER
----- Message from Jennifer Dumont MD sent at 7/9/2020  8:49 AM EDT -----  All of the random colon biopsies showed normal tissue.  No evidence of inflammation or significant change    The colon polyp was a tubular adenoma.  In the absence of symptoms, his next colonoscopy should be in 5 years, please place in recall.    Follow-up with Kerry in 6 to 8 weeks, thanks

## 2020-07-16 NOTE — TELEPHONE ENCOUNTER
Call to pt.  Advise per DR Dumont note.  Verb understanding.     C/s for 7/6/25 placed in recall.     Appt scheduled with NGA Anderson for 8/31 @ 9:15 am.

## 2020-07-17 ENCOUNTER — OFFICE VISIT (OUTPATIENT)
Dept: INTERNAL MEDICINE | Facility: CLINIC | Age: 30
End: 2020-07-17

## 2020-07-17 VITALS
WEIGHT: 254 LBS | BODY MASS INDEX: 37.62 KG/M2 | DIASTOLIC BLOOD PRESSURE: 82 MMHG | HEIGHT: 69 IN | SYSTOLIC BLOOD PRESSURE: 140 MMHG | OXYGEN SATURATION: 98 % | HEART RATE: 69 BPM

## 2020-07-17 DIAGNOSIS — E55.9 HYPOVITAMINOSIS D: Primary | ICD-10-CM

## 2020-07-17 DIAGNOSIS — Z00.00 HEALTHCARE MAINTENANCE: ICD-10-CM

## 2020-07-17 DIAGNOSIS — K59.09 CHRONIC CONSTIPATION: ICD-10-CM

## 2020-07-17 DIAGNOSIS — Z87.891 FORMER SMOKER: ICD-10-CM

## 2020-07-17 PROBLEM — F17.200 CURRENT EVERY DAY SMOKER: Status: RESOLVED | Noted: 2017-04-05 | Resolved: 2020-07-17

## 2020-07-17 LAB
25(OH)D3+25(OH)D2 SERPL-MCNC: 31.3 NG/ML (ref 30–100)
ALBUMIN SERPL-MCNC: 4.8 G/DL (ref 3.5–5.2)
ALBUMIN/GLOB SERPL: 2.1 G/DL
ALP SERPL-CCNC: 93 U/L (ref 39–117)
ALT SERPL-CCNC: 30 U/L (ref 1–41)
AST SERPL-CCNC: 22 U/L (ref 1–40)
BILIRUB SERPL-MCNC: 0.2 MG/DL (ref 0–1.2)
BUN SERPL-MCNC: 11 MG/DL (ref 6–20)
BUN/CREAT SERPL: 13.8 (ref 7–25)
CALCIUM SERPL-MCNC: 9.8 MG/DL (ref 8.6–10.5)
CHLORIDE SERPL-SCNC: 103 MMOL/L (ref 98–107)
CHOLEST SERPL-MCNC: 160 MG/DL (ref 0–200)
CO2 SERPL-SCNC: 26.2 MMOL/L (ref 22–29)
CREAT SERPL-MCNC: 0.8 MG/DL (ref 0.76–1.27)
ERYTHROCYTE [DISTWIDTH] IN BLOOD BY AUTOMATED COUNT: 12.2 % (ref 12.3–15.4)
GLOBULIN SER CALC-MCNC: 2.3 GM/DL
GLUCOSE SERPL-MCNC: 88 MG/DL (ref 65–99)
HCT VFR BLD AUTO: 44.5 % (ref 37.5–51)
HDLC SERPL-MCNC: 41 MG/DL (ref 40–60)
HGB BLD-MCNC: 15.6 G/DL (ref 13–17.7)
LDLC SERPL CALC-MCNC: 96 MG/DL (ref 0–100)
MCH RBC QN AUTO: 34.4 PG (ref 26.6–33)
MCHC RBC AUTO-ENTMCNC: 35.1 G/DL (ref 31.5–35.7)
MCV RBC AUTO: 98.2 FL (ref 79–97)
PLATELET # BLD AUTO: 228 10*3/MM3 (ref 140–450)
POTASSIUM SERPL-SCNC: 4.3 MMOL/L (ref 3.5–5.2)
PROT SERPL-MCNC: 7.1 G/DL (ref 6–8.5)
RBC # BLD AUTO: 4.53 10*6/MM3 (ref 4.14–5.8)
SODIUM SERPL-SCNC: 140 MMOL/L (ref 136–145)
T4 FREE SERPL-MCNC: 1.19 NG/DL (ref 0.93–1.7)
TRIGL SERPL-MCNC: 113 MG/DL (ref 0–150)
TSH SERPL DL<=0.005 MIU/L-ACNC: 1.25 UIU/ML (ref 0.27–4.2)
VLDLC SERPL CALC-MCNC: 22.6 MG/DL
WBC # BLD AUTO: 8.03 10*3/MM3 (ref 3.4–10.8)

## 2020-07-17 PROCEDURE — 99214 OFFICE O/P EST MOD 30 MIN: CPT | Performed by: NURSE PRACTITIONER

## 2020-07-17 NOTE — PROGRESS NOTES
"Subjective   Kennedy Meyer is a 29 y.o. male.   CC: 6 month follow up, vit D deficiency, chronic constipation, former smoker     Pt presents for 6 month follow up. This is a 29 YOM.     He has vitamin D deficiency currently taking vitamin D 50,000 units weekly reporting good compliance with this medication.     He is a former smoker, having very recently quit in June 2020 with the aid of Chantix. Reports that he feels great off of cigarettes and plants to continue with nonsmoking status.     He has chronic constipation and follows with GI, Dr. Dumont. He had a colonoscopy on 7/6/2020. He had a tubular adenoma and plan is for repeat in 5 years. Internal hemorrhoids shown as well.     Reports that last week he felt \"tenderness\" beneath his chin around his lymph nodes. Could not identify any swelling. This went away within 1-2 days and he has not felt tender beneath the chin since last week. No SOA, cough, sore throat, chest discomfort, fever or chills.     He denies development of any other new issues today.        The following portions of the patient's history were reviewed and updated as appropriate: allergies, current medications, past family history, past medical history, past social history, past surgical history and problem list.    Review of Systems   Constitutional: Negative for activity change, chills, fatigue, fever, unexpected weight gain and unexpected weight loss.   HENT: Negative for congestion, hearing loss, postnasal drip, sinus pressure, sneezing, sore throat, swollen glands and tinnitus.    Eyes: Negative for photophobia, pain and visual disturbance.   Respiratory: Negative for cough, chest tightness, shortness of breath and wheezing.    Cardiovascular: Negative for chest pain, palpitations and leg swelling.   Gastrointestinal: Negative for abdominal distention, abdominal pain, constipation, diarrhea, nausea and vomiting.   Endocrine: Negative for polydipsia, polyphagia and polyuria. " "  Genitourinary: Negative for dysuria, frequency, hematuria and urgency.   Neurological: Negative for dizziness, weakness, numbness and headache.   All other systems reviewed and are negative.      Objective    /82 (BP Location: Left arm, Patient Position: Sitting, Cuff Size: Adult)   Pulse 69   Ht 175.3 cm (69\")   Wt 115 kg (254 lb)   SpO2 98%   BMI 37.51 kg/m²     Physical Exam   Constitutional: He is oriented to person, place, and time. He appears well-developed and well-nourished. No distress.   HENT:   Head: Normocephalic and atraumatic.   Eyes: Pupils are equal, round, and reactive to light. EOM are normal.   Neck: Normal range of motion. Neck supple. No JVD present. No tracheal deviation present. No thyromegaly present.   No carotid bruits auscultated   Cardiovascular: Normal rate, regular rhythm, normal heart sounds and intact distal pulses. Exam reveals no gallop and no friction rub.   No murmur heard.  No peripheral edema. Posterior tib pulses 2+ and equal bilat.   Pulmonary/Chest: Effort normal and breath sounds normal. No stridor. No respiratory distress. He has no wheezes. He has no rales. He exhibits no tenderness.   Lungs CTA bilat.   Abdominal: Soft. Bowel sounds are normal. He exhibits no distension. There is no tenderness.   Musculoskeletal: Normal range of motion.   Lymphadenopathy:     He has no cervical adenopathy.   Neurological: He is alert and oriented to person, place, and time.   Skin: Skin is warm and dry. Capillary refill takes less than 2 seconds. He is not diaphoretic.   Psychiatric: He has a normal mood and affect. His behavior is normal. Judgment and thought content normal.   Nursing note and vitals reviewed.    Current outpatient and discharge medications have been reconciled for the patient.  Reviewed by: PETERSON Leigh      Assessment/Plan   Kennedy was seen today for follow-up.    Diagnoses and all orders for this visit:    Hypovitaminosis D  -     Vitamin D 25 " hydroxy    Former smoker  -     CBC No Differential  -     Comprehensive metabolic panel  -     Lipid panel  -     TSH  -     T4, Free    Chronic constipation    Healthcare maintenance  -     CBC No Differential  -     Comprehensive metabolic panel  -     Lipid panel  -     Vitamin D 25 hydroxy  -     TSH  -     T4, Free      -Hypovitaminosis D: Continue supplement. We will check vit D level today and adjust tx if needed based on labs.     -Tobacco use: He quit smoking completely as of last month with Chantix. No longer taking Chantix and he is committed to staying off of cigarettes.     -Chronic constipation: Following with Dr. Dumont. Recently colonoscopy reviewed.     -Healthcare maintenance UTD.     We will contact pt with his lab results and any further recommendations. Follow up PRN and I will see him back in six months for CPE.

## 2020-07-20 ENCOUNTER — PATIENT MESSAGE (OUTPATIENT)
Dept: INTERNAL MEDICINE | Facility: CLINIC | Age: 30
End: 2020-07-20

## 2020-07-20 NOTE — TELEPHONE ENCOUNTER
From: Kennedy Meyer  To: Anne Maravilla APRN  Sent: 7/20/2020 10:22 AM EDT  Subject: Test Results Question    I have a question about CBC resulted on 7/17/20, 7:07 PM.    What's RDW?

## 2020-07-20 NOTE — PROGRESS NOTES
Please notify patient that blood count looks stable, no anemia.  Metabolic panel is stable including kidney, liver function and electrolytes.  Cholesterol panel looks great.  Vitamin D is on the low end of normal but normal.  Please continue with the 50,000 unit weekly supplement for now.  Thyroid labs are normal.

## 2020-07-30 ENCOUNTER — TELEPHONE (OUTPATIENT)
Dept: GASTROENTEROLOGY | Facility: CLINIC | Age: 30
End: 2020-07-30

## 2020-07-30 NOTE — TELEPHONE ENCOUNTER
----- Message from Kennedy Meyer sent at 7/29/2020  5:11 PM EDT -----  Regarding: Non-Urgent Medical Question  Contact: [phone]  Could I have EPI?

## 2020-08-03 ENCOUNTER — OFFICE VISIT (OUTPATIENT)
Dept: INTERNAL MEDICINE | Facility: CLINIC | Age: 30
End: 2020-08-03

## 2020-08-03 VITALS
SYSTOLIC BLOOD PRESSURE: 150 MMHG | HEART RATE: 63 BPM | TEMPERATURE: 98.1 F | RESPIRATION RATE: 16 BRPM | WEIGHT: 252 LBS | DIASTOLIC BLOOD PRESSURE: 94 MMHG | OXYGEN SATURATION: 98 % | BODY MASS INDEX: 37.33 KG/M2 | HEIGHT: 69 IN

## 2020-08-03 DIAGNOSIS — K62.89 RECTAL PAIN: Primary | ICD-10-CM

## 2020-08-03 DIAGNOSIS — M79.671 RIGHT FOOT PAIN: ICD-10-CM

## 2020-08-03 PROCEDURE — 99213 OFFICE O/P EST LOW 20 MIN: CPT | Performed by: NURSE PRACTITIONER

## 2020-08-03 RX ORDER — HYDROCORTISONE ACETATE 25 MG/1
25 SUPPOSITORY RECTAL 2 TIMES DAILY PRN
Qty: 12 EACH | Refills: 0 | Status: SHIPPED | OUTPATIENT
Start: 2020-08-03 | End: 2021-02-19

## 2020-08-03 NOTE — PROGRESS NOTES
"Subjective   Kennedy Meyer is a 29 y.o. male.   Chief Complaint   Patient presents with   • Foot Pain     Pt presents here today with a right foot issue, Pt states he can feel the temperature change.   • Abdominal Pain       Patient presents for evaluation of right foot heat.  This is a 29-year-old male.  This problem has been present for 2 weeks.  It waxes and wanes.  It occurs 4-5 times daily.  He endorses \"sudden sensation of heat in my foot.\"  No mechanism of injury, falls, twisting motion.  Denies any change of color, erythema.  He denies any swelling in the foot or leg.    He also endorses rectal pain that has been present for the past 2 weeks.  He had a colonoscopy in July 2020 which showed some internal hemorrhoids.  He denies seeing any external hemorrhoids at this time but has been having this rectal pain periodically throughout the past couple of weeks and wonders whether this is contributing.  He does have chronic intermittent constipation follows with LEO Lipscomb for this.  He has a follow-up at the end of this month with GI.  He wonders whether there is anything he can take for this pain    His blood pressure slightly high today at 150/94.  Denies headache, visual changes, shortness of breath or chest discomfort.  Currently taking no antihypertensive medication.  He states \"I have been eating a lot of salt lately.\"    He denies development of any other new issues today.       The following portions of the patient's history were reviewed and updated as appropriate: allergies, current medications, past family history, past medical history, past social history, past surgical history and problem list.    Review of Systems   Constitutional: Negative for activity change, chills, fatigue, fever, unexpected weight gain and unexpected weight loss.   HENT: Negative for congestion, hearing loss, postnasal drip, sinus pressure, sneezing, sore throat, swollen glands and tinnitus.    Eyes: Negative for " "photophobia, pain and visual disturbance.   Respiratory: Negative for cough, chest tightness, shortness of breath and wheezing.    Cardiovascular: Negative for chest pain, palpitations and leg swelling.   Gastrointestinal: Negative for abdominal distention, abdominal pain, constipation, diarrhea, nausea and vomiting.   Endocrine: Negative for polydipsia, polyphagia and polyuria.   Genitourinary: Negative for dysuria, frequency, hematuria and urgency.   Neurological: Negative for dizziness, weakness, numbness and headache.   All other systems reviewed and are negative.      Objective    /94 (BP Location: Right arm, Patient Position: Sitting, Cuff Size: Adult)   Pulse 63   Temp 98.1 °F (36.7 °C) (Oral)   Resp 16   Ht 175.3 cm (69\")   Wt 114 kg (252 lb)   SpO2 98%   BMI 37.21 kg/m²     Physical Exam   Constitutional: He is oriented to person, place, and time. He appears well-developed and well-nourished. No distress.   HENT:   Head: Normocephalic and atraumatic.   Right Ear: External ear normal.   Left Ear: External ear normal.   Nose: Nose normal.   Mouth/Throat: Oropharynx is clear and moist.   Eyes: Pupils are equal, round, and reactive to light. EOM are normal.   Neck: Normal range of motion. Neck supple.   Cardiovascular: Normal rate, regular rhythm, normal heart sounds and intact distal pulses. Exam reveals no gallop and no friction rub.   No murmur heard.  No peripheral edema.  Posterior tib pulses 2+ and equal bilaterally.   Pulmonary/Chest: Effort normal and breath sounds normal. No stridor. No respiratory distress. He has no wheezes. He has no rales. He exhibits no tenderness.   Lungs CTA bilaterally   Abdominal: Soft. Bowel sounds are normal. He exhibits no distension. There is no tenderness.   Bowel sounds active x4 quadrants   Musculoskeletal: Normal range of motion.   Neurological: He is alert and oriented to person, place, and time.   Skin: Skin is warm and dry. Capillary refill takes less " than 2 seconds. He is not diaphoretic.   Psychiatric: He has a normal mood and affect. His behavior is normal. Judgment and thought content normal.   Nursing note and vitals reviewed.    Current outpatient and discharge medications have been reconciled for the patient.  Reviewed by: PETERSON Leigh      Assessment/Plan   Kennedy was seen today for foot pain and abdominal pain.    Diagnoses and all orders for this visit:    Rectal pain  -     hydrocortisone (ANUSOL-HC) 25 MG suppository; Insert 1 suppository into the rectum 2 (Two) Times a Day As Needed for Hemorrhoids.    Right foot pain  -     Cancel: Duplex Venous Lower Extremity - Right CAR  -     Duplex Venous Lower Extremity - Right CAR; Future      -Rectal pain: Potentially related to hemorrhoids.  Provided Anusol suppositories.  He will follow-up with GI if this persists or worsens.    -Right foot discomfort: Describes this not as pain but has subjective heat.  On physical exam no evidence of cellulitis, heat or abnormal pulses.  We will check a venous Doppler of the right lower extremity.    BP in the office is high today at 150/94.  We discussed lowering dietary sodium and increasing physical activity.  He will begin to monitor his blood pressure 1-2 times weekly at home for goal of less than 130/80 and he will let me know if he is seeing readings consistently higher than this.    We will contact patient with his imaging results and any further recommendations.  Follow-up PRN and I will see him back in the office at his next scheduled appointment.

## 2020-08-03 NOTE — PATIENT INSTRUCTIONS
"Please check your blood pressure regularly at home and let me know if readings are greater than 130/80 consistently.       DASH Eating Plan  DASH stands for \"Dietary Approaches to Stop Hypertension.\" The DASH eating plan is a healthy eating plan that has been shown to reduce high blood pressure (hypertension). It may also reduce your risk for type 2 diabetes, heart disease, and stroke. The DASH eating plan may also help with weight loss.  What are tips for following this plan?    General guidelines  · Avoid eating more than 2,300 mg (milligrams) of salt (sodium) a day. If you have hypertension, you may need to reduce your sodium intake to 1,500 mg a day.  · Limit alcohol intake to no more than 1 drink a day for nonpregnant women and 2 drinks a day for men. One drink equals 12 oz of beer, 5 oz of wine, or 1½ oz of hard liquor.  · Work with your health care provider to maintain a healthy body weight or to lose weight. Ask what an ideal weight is for you.  · Get at least 30 minutes of exercise that causes your heart to beat faster (aerobic exercise) most days of the week. Activities may include walking, swimming, or biking.  · Work with your health care provider or diet and nutrition specialist (dietitian) to adjust your eating plan to your individual calorie needs.  Reading food labels    · Check food labels for the amount of sodium per serving. Choose foods with less than 5 percent of the Daily Value of sodium. Generally, foods with less than 300 mg of sodium per serving fit into this eating plan.  · To find whole grains, look for the word \"whole\" as the first word in the ingredient list.  Shopping  · Buy products labeled as \"low-sodium\" or \"no salt added.\"  · Buy fresh foods. Avoid canned foods and premade or frozen meals.  Cooking  · Avoid adding salt when cooking. Use salt-free seasonings or herbs instead of table salt or sea salt. Check with your health care provider or pharmacist before using salt " substitutes.  · Do not hutchins foods. Cook foods using healthy methods such as baking, boiling, grilling, and broiling instead.  · Cook with heart-healthy oils, such as olive, canola, soybean, or sunflower oil.  Meal planning  · Eat a balanced diet that includes:  ? 5 or more servings of fruits and vegetables each day. At each meal, try to fill half of your plate with fruits and vegetables.  ? Up to 6-8 servings of whole grains each day.  ? Less than 6 oz of lean meat, poultry, or fish each day. A 3-oz serving of meat is about the same size as a deck of cards. One egg equals 1 oz.  ? 2 servings of low-fat dairy each day.  ? A serving of nuts, seeds, or beans 5 times each week.  ? Heart-healthy fats. Healthy fats called Omega-3 fatty acids are found in foods such as flaxseeds and coldwater fish, like sardines, salmon, and mackerel.  · Limit how much you eat of the following:  ? Canned or prepackaged foods.  ? Food that is high in trans fat, such as fried foods.  ? Food that is high in saturated fat, such as fatty meat.  ? Sweets, desserts, sugary drinks, and other foods with added sugar.  ? Full-fat dairy products.  · Do not salt foods before eating.  · Try to eat at least 2 vegetarian meals each week.  · Eat more home-cooked food and less restaurant, buffet, and fast food.  · When eating at a restaurant, ask that your food be prepared with less salt or no salt, if possible.  What foods are recommended?  The items listed may not be a complete list. Talk with your dietitian about what dietary choices are best for you.  Grains  Whole-grain or whole-wheat bread. Whole-grain or whole-wheat pasta. Brown rice. Oatmeal. Quinoa. Bulgur. Whole-grain and low-sodium cereals. Nyla bread. Low-fat, low-sodium crackers. Whole-wheat flour tortillas.  Vegetables  Fresh or frozen vegetables (raw, steamed, roasted, or grilled). Low-sodium or reduced-sodium tomato and vegetable juice. Low-sodium or reduced-sodium tomato sauce and tomato  paste. Low-sodium or reduced-sodium canned vegetables.  Fruits  All fresh, dried, or frozen fruit. Canned fruit in natural juice (without added sugar).  Meat and other protein foods  Skinless chicken or turkey. Ground chicken or turkey. Pork with fat trimmed off. Fish and seafood. Egg whites. Dried beans, peas, or lentils. Unsalted nuts, nut butters, and seeds. Unsalted canned beans. Lean cuts of beef with fat trimmed off. Low-sodium, lean deli meat.  Dairy  Low-fat (1%) or fat-free (skim) milk. Fat-free, low-fat, or reduced-fat cheeses. Nonfat, low-sodium ricotta or cottage cheese. Low-fat or nonfat yogurt. Low-fat, low-sodium cheese.  Fats and oils  Soft margarine without trans fats. Vegetable oil. Low-fat, reduced-fat, or light mayonnaise and salad dressings (reduced-sodium). Canola, safflower, olive, soybean, and sunflower oils. Avocado.  Seasoning and other foods  Herbs. Spices. Seasoning mixes without salt. Unsalted popcorn and pretzels. Fat-free sweets.  What foods are not recommended?  The items listed may not be a complete list. Talk with your dietitian about what dietary choices are best for you.  Grains  Baked goods made with fat, such as croissants, muffins, or some breads. Dry pasta or rice meal packs.  Vegetables  Creamed or fried vegetables. Vegetables in a cheese sauce. Regular canned vegetables (not low-sodium or reduced-sodium). Regular canned tomato sauce and paste (not low-sodium or reduced-sodium). Regular tomato and vegetable juice (not low-sodium or reduced-sodium). Pickles. Olives.  Fruits  Canned fruit in a light or heavy syrup. Fried fruit. Fruit in cream or butter sauce.  Meat and other protein foods  Fatty cuts of meat. Ribs. Fried meat. [place]. Sausage. Bologna and other processed lunch meats. Salami. Fatback. Hotdogs. Bratwurst. Salted nuts and seeds. Canned beans with added salt. Canned or smoked fish. Whole eggs or egg yolks. Chicken or turkey with skin.  Dairy  Whole or 2% milk,  cream, and half-and-half. Whole or full-fat cream cheese. Whole-fat or sweetened yogurt. Full-fat cheese. Nondairy creamers. Whipped toppings. Processed cheese and cheese spreads.  Fats and oils  Butter. Stick margarine. Lard. Shortening. Ghee. [place] fat. Tropical oils, such as coconut, palm kernel, or palm oil.  Seasoning and other foods  Salted popcorn and pretzels. Onion salt, garlic salt, seasoned salt, table salt, and sea salt. Worcestershire sauce. Tartar sauce. Barbecue sauce. Teriyaki sauce. Soy sauce, including reduced-sodium. Steak sauce. Canned and packaged gravies. Fish sauce. Oyster sauce. Cocktail sauce. Horseradish that you find on the shelf. Ketchup. Mustard. Meat flavorings and tenderizers. Bouillon cubes. Hot sauce and Tabasco sauce. Premade or packaged marinades. Premade or packaged taco seasonings. Relishes. Regular salad dressings.  Where to find more information:  · National Heart, Lung, and Blood [place]: www.nhlbi.nih.gov  · American Heart Association: www.heart.org  Summary  · The DASH eating plan is a healthy eating plan that has been shown to reduce high blood pressure (hypertension). It may also reduce your risk for type 2 diabetes, heart disease, and stroke.  · With the DASH eating plan, you should limit salt (sodium) intake to 2,300 mg a day. If you have hypertension, you may need to reduce your sodium intake to 1,500 mg a day.  · When on the DASH eating plan, aim to eat more fresh fruits and vegetables, whole grains, lean proteins, low-fat dairy, and heart-healthy fats.  · Work with your health care provider or diet and nutrition specialist (dietitian) to adjust your eating plan to your individual calorie needs.  This information is not intended to replace advice given to you by your health care provider. Make sure you discuss any questions you have with your health care provider.  Document Released: 12/06/2012 Document Revised: 11/30/2018 Document Reviewed: 12/11/2017  [employer]  Patient Education © 2020 [employer]

## 2020-08-06 ENCOUNTER — TELEPHONE (OUTPATIENT)
Dept: GASTROENTEROLOGY | Facility: CLINIC | Age: 30
End: 2020-08-06

## 2020-08-06 ENCOUNTER — HOSPITAL ENCOUNTER (OUTPATIENT)
Dept: CARDIOLOGY | Facility: HOSPITAL | Age: 30
Discharge: HOME | End: 2020-08-06
Admitting: NURSE PRACTITIONER

## 2020-08-06 DIAGNOSIS — M79.671 RIGHT FOOT PAIN: ICD-10-CM

## 2020-08-06 LAB
BH CV LOWER VASCULAR LEFT COMMON FEMORAL AUGMENT: NORMAL
BH CV LOWER VASCULAR LEFT COMMON FEMORAL COMPETENT: NORMAL
BH CV LOWER VASCULAR LEFT COMMON FEMORAL COMPRESS: NORMAL
BH CV LOWER VASCULAR LEFT COMMON FEMORAL PHASIC: NORMAL
BH CV LOWER VASCULAR LEFT COMMON FEMORAL SPONT: NORMAL
BH CV LOWER VASCULAR RIGHT COMMON FEMORAL AUGMENT: NORMAL
BH CV LOWER VASCULAR RIGHT COMMON FEMORAL COMPETENT: NORMAL
BH CV LOWER VASCULAR RIGHT COMMON FEMORAL COMPRESS: NORMAL
BH CV LOWER VASCULAR RIGHT COMMON FEMORAL PHASIC: NORMAL
BH CV LOWER VASCULAR RIGHT COMMON FEMORAL SPONT: NORMAL
BH CV LOWER VASCULAR RIGHT DISTAL FEMORAL COMPRESS: NORMAL
BH CV LOWER VASCULAR RIGHT GASTRONEMIUS COMPRESS: NORMAL
BH CV LOWER VASCULAR RIGHT GREATER SAPH AK COMPRESS: NORMAL
BH CV LOWER VASCULAR RIGHT GREATER SAPH BK COMPRESS: NORMAL
BH CV LOWER VASCULAR RIGHT MID FEMORAL AUGMENT: NORMAL
BH CV LOWER VASCULAR RIGHT MID FEMORAL COMPETENT: NORMAL
BH CV LOWER VASCULAR RIGHT MID FEMORAL COMPRESS: NORMAL
BH CV LOWER VASCULAR RIGHT MID FEMORAL PHASIC: NORMAL
BH CV LOWER VASCULAR RIGHT MID FEMORAL SPONT: NORMAL
BH CV LOWER VASCULAR RIGHT PERONEAL COMPRESS: NORMAL
BH CV LOWER VASCULAR RIGHT POPLITEAL AUGMENT: NORMAL
BH CV LOWER VASCULAR RIGHT POPLITEAL COMPETENT: NORMAL
BH CV LOWER VASCULAR RIGHT POPLITEAL COMPRESS: NORMAL
BH CV LOWER VASCULAR RIGHT POPLITEAL PHASIC: NORMAL
BH CV LOWER VASCULAR RIGHT POPLITEAL SPONT: NORMAL
BH CV LOWER VASCULAR RIGHT POSTERIOR TIBIAL COMPRESS: NORMAL
BH CV LOWER VASCULAR RIGHT PROFUNDA FEMORAL COMPRESS: NORMAL
BH CV LOWER VASCULAR RIGHT PROXIMAL FEMORAL COMPRESS: NORMAL
BH CV LOWER VASCULAR RIGHT SAPHENOFEMORAL JUNCTION COMPRESS: NORMAL

## 2020-08-06 PROCEDURE — 93971 EXTREMITY STUDY: CPT

## 2020-08-06 NOTE — TELEPHONE ENCOUNTER
We have tested for this with all of the imaging and the colonoscopy and lab work that we have done.  So far, this appears to be irritable bowel-constipation.  Nothing has been consistent with Crohn's disease as yet.  Recommend he go to a reputable online resource ([facility] or [facility]) for further information on IBS-C.

## 2020-08-06 NOTE — PROGRESS NOTES
Please notify patient that his venous Doppler is negative for blood clot or venous abnormalities.  Please let me know if symptoms persist or worsen.  I recommend compression hose for development of any swelling and consistent elevation of the lower extremities.

## 2020-08-06 NOTE — TELEPHONE ENCOUNTER
----- Message from Kennedy Meyer sent at 8/6/2020  2:38 PM EDT -----  Regarding: Complaint  Contact: [phone]  Possibly Crohn's? Have we tested for that?

## 2020-08-07 ENCOUNTER — PATIENT MESSAGE (OUTPATIENT)
Dept: INTERNAL MEDICINE | Facility: CLINIC | Age: 30
End: 2020-08-07

## 2020-08-07 NOTE — TELEPHONE ENCOUNTER
"Call to pt.  Advise per DR Dumont note.  Verb understanding.     States 5x this week has experienced \"crazy pain in stomach and rectum\".  Generally occurs at night between 11p -3a.   Denies blood.  States knows has internal hemorrhoids.  Contacted pcp and was prescribed anusol supp - awaiting insurance auth.  Pt states will call and check cash price.     Update to Dr Dumont.   "

## 2020-08-07 NOTE — TELEPHONE ENCOUNTER
It was the Anusol suppositories. It's not covered on his drug plan and the out of pocket cash price would e $206.49.    So I called [facility] and asked them to apply a Good Rx card for the same medication and it will be $42.71 cash price out of pocket.     I called Mr. Meyer and told him what I did he stated that is a better price to pay out of pocket.    No further action is needed.    Thanks,    Christopher

## 2020-08-07 NOTE — TELEPHONE ENCOUNTER
From: Kennedy Meyer  To: Anne Maravilla APRN  Sent: 8/7/2020 2:42 PM EDT  Subject: Prescription Question    Still awaiting for insurance. These drugs are $200 for 12 of them. Can you lean on insurance or should I just pay cash price?

## 2020-08-21 DIAGNOSIS — F17.200 CURRENT SMOKER: Primary | ICD-10-CM

## 2020-08-21 DIAGNOSIS — E55.9 HYPOVITAMINOSIS D: ICD-10-CM

## 2020-08-21 RX ORDER — ERGOCALCIFEROL 1.25 MG/1
50000 CAPSULE ORAL WEEKLY
Qty: 12 CAPSULE | Refills: 1 | Status: SHIPPED | OUTPATIENT
Start: 2020-08-21 | End: 2021-07-28

## 2020-08-31 ENCOUNTER — OFFICE VISIT (OUTPATIENT)
Dept: GASTROENTEROLOGY | Facility: CLINIC | Age: 30
End: 2020-08-31

## 2020-08-31 VITALS — HEIGHT: 69 IN | TEMPERATURE: 97 F | BODY MASS INDEX: 37.95 KG/M2 | WEIGHT: 256.2 LBS

## 2020-08-31 DIAGNOSIS — D12.6 ADENOMATOUS POLYP OF COLON, UNSPECIFIED PART OF COLON: ICD-10-CM

## 2020-08-31 DIAGNOSIS — K58.1 IRRITABLE BOWEL SYNDROME WITH CONSTIPATION: Primary | ICD-10-CM

## 2020-08-31 PROCEDURE — 99214 OFFICE O/P EST MOD 30 MIN: CPT | Performed by: NURSE PRACTITIONER

## 2020-08-31 NOTE — PROGRESS NOTES
Chief Complaint   Patient presents with   • Abdominal Pain   • Constipation       Kennedy Meyer is a  29 y.o. male here for a follow up visit for constipation.  HPI  29-year-old male presents today for follow-up visit for constipation.  He is a patient of Dr. Dumont.  He was last seen in the office on 6/9/2020.  He underwent colonoscopy on 7/6/2020 that showed rectosigmoid inflammation with a polyp and nonbleeding internal hemorrhoids.  Path was positive for adenomatous colon polyp.  He is happy to report that his bowels are moving better with Linzess 72 once a day.  He tells me he will take it for about 5 days in a row and then he has to stop because it causes too much diarrhea.  He still has the samples of the true Lucas but he has not tried them yet.  He does know that Amitiza does not work well for him.  He is also not had any luck with MiraLAX over-the-counter.  He does right now his bowels are moving pretty good if he takes the Linzess for about 5 days in a row and then hold off for a couple of days.  He is also stopped eating red meat he thinks this is helped to.  He is mainly just eating fish now.  He denies any dysphasia, reflux, abdominal pain, nausea and vomiting, diarrhea, rectal bleeding or melena.  Admits his appetite is good and his weight is stable.  He did have a CT scan done on 6/7/2020 which was negative for any acute abdominal process.  He was given some Anusol suppositories for his hemorrhoidal pain but he admits all of his symptoms stopped so he has not had to use the suppositories.      History reviewed. No pertinent past medical history.    Past Surgical History:   Procedure Laterality Date   • COLONOSCOPY N/A 7/6/2020    Procedure: COLONOSCOPY to cecum and TI with cold polypectomy;  Surgeon: Jennifer Dumont MD;  Location: [facility] ENDOSCOPY;  Service: Gastroenterology;  Laterality: N/A;  pre - abnormal CT  post - recto-sigmoid inflammation, polyp, hemorrhoids   • LATERAL RECTUS RECESSION  Right 2015   • TONSILLECTOMY AND ADENOIDECTOMY         Scheduled Meds:    Continuous Infusions:  No current facility-administered medications for this visit.     PRN Meds:.    No Known Allergies    Social History     Socioeconomic History   • Marital status: Single     Spouse name: Not on file   • Number of children: Not on file   • Years of education: Not on file   • Highest education level: Not on file   Tobacco Use   • Smoking status: Former Smoker     Packs/day: 0.25     Years: 5.00     Pack years: 1.25     Types: Cigarettes, Electronic Cigarette     Last attempt to quit:      Years since quittin.6   • Smokeless tobacco: Never Used   • Tobacco comment: may 2020   Substance and Sexual Activity   • Alcohol use: Not Currently   • Drug use: No   • Sexual activity: Yes     Partners: Female     Birth control/protection: Condom       Family History   Problem Relation Age of Onset   • Diabetes Other         Borderline   • Hypertension Other        Review of Systems   Constitutional: Negative for appetite change, chills, diaphoresis, fatigue, fever and unexpected weight change.   HENT: Negative for nosebleeds, postnasal drip, sore throat, trouble swallowing and voice change.    Respiratory: Negative for cough, choking, chest tightness, shortness of breath and wheezing.    Cardiovascular: Negative for chest pain, palpitations and leg swelling.   Gastrointestinal: Positive for abdominal distention. Negative for abdominal pain, anal bleeding, blood in stool, constipation, diarrhea, nausea, rectal pain and vomiting.   Endocrine: Negative for polydipsia, polyphagia and polyuria.   Musculoskeletal: Negative for gait problem.   Skin: Negative for rash and wound.   Allergic/Immunologic: Negative for food allergies.   Neurological: Negative for dizziness, speech difficulty and light-headedness.   Psychiatric/Behavioral: Negative for confusion, self-injury, sleep disturbance and suicidal ideas.       Vitals:     08/31/20 0908   Temp: 97 °F (36.1 °C)       Physical Exam   Constitutional: He is oriented to person, place, and time. He appears well-developed and well-nourished. He does not appear ill. No distress.   HENT:   Head: Normocephalic.   Eyes: Pupils are equal, round, and reactive to light.   Cardiovascular: Normal rate, regular rhythm and normal heart sounds.   Pulmonary/Chest: Effort normal and breath sounds normal.   Abdominal: Soft. Bowel sounds are normal. He exhibits no distension and no mass. There is no hepatosplenomegaly. There is no tenderness. There is no rebound and no guarding. No hernia.   Musculoskeletal: Normal range of motion.   Neurological: He is alert and oriented to person, place, and time.   Skin: Skin is warm and dry.   Psychiatric: He has a normal mood and affect. His speech is normal and behavior is normal. Judgment normal.       No radiology results for the last 7 days     Assessment and plan     1. Irritable bowel syndrome with constipation    2. Adenomatous polyp of colon, unspecified part of colon    Reviewed colonoscopy results with him today.  Bowels are moving pretty good on Linzess 72 every day.  He is still playing around with the dose takes it about 5 days in a row and then has to stop due to the diarrhea.  I did want him to try the truLance samples he was given last visit.  I think he might like it better.  Continue to avoid red meat since it sounds like it makes his IBS worse.  Currently patient seems to be doing well.  Never had to use the Anusol suppositories.  Patient to call the office with any issues.  Recall colonoscopy in 5 years.  Patient to follow-up with me in 3 months.

## 2020-09-02 ENCOUNTER — TELEPHONE (OUTPATIENT)
Dept: GASTROENTEROLOGY | Facility: CLINIC | Age: 30
End: 2020-09-02

## 2020-09-02 NOTE — TELEPHONE ENCOUNTER
----- Message from Kennedy Meyer sent at 9/2/2020  8:40 AM EDT -----  Regarding: Visit Follow-Up Question  Contact: [phone]  You said you all found and removed a precancerous polyp. What's that men exactly? Precancerous? Like cancer is on its way?

## 2020-09-02 NOTE — TELEPHONE ENCOUNTER
Call to pt.  Advise per note of 7/16 that precancerous polyp means that polyp MIGHT turn into cancer.  This polyp has been removed, and recall for 7/6/25 is per American [employer] Society research and guidelines.  Verb understanding.

## 2020-09-10 ENCOUNTER — OFFICE VISIT (OUTPATIENT)
Dept: INTERNAL MEDICINE | Facility: CLINIC | Age: 30
End: 2020-09-10

## 2020-09-10 VITALS
WEIGHT: 258 LBS | HEART RATE: 68 BPM | OXYGEN SATURATION: 98 % | TEMPERATURE: 98.3 F | BODY MASS INDEX: 38.21 KG/M2 | SYSTOLIC BLOOD PRESSURE: 136 MMHG | HEIGHT: 69 IN | DIASTOLIC BLOOD PRESSURE: 90 MMHG

## 2020-09-10 DIAGNOSIS — R20.2 TINGLING: Primary | ICD-10-CM

## 2020-09-10 PROCEDURE — 99213 OFFICE O/P EST LOW 20 MIN: CPT | Performed by: NURSE PRACTITIONER

## 2020-09-10 NOTE — PROGRESS NOTES
"Subjective   Kennedy Meyer is a 29 y.o. male.   CC: Right finger tips numb.tingling     Patient presents for evaluation of right fingertip tingling/numbness.  This is a 29-year-old male.  He has a history of left-sided carpal tunnel syndrome.  Reports that he has occasional wrist pain and he does sit at a desk using a computer/mouse for most of the day each day.  He endorses tingling and numbness in his right thumb, pointer finger, middle finger and ring finger.  The pinky is unaffected.  Denies any trauma, swelling or erythema.  This issue has been going on for 1 to 2 weeks.    He denies development of any other new issues today.       The following portions of the patient's history were reviewed and updated as appropriate: allergies, current medications, past family history, past medical history, past social history, past surgical history and problem list.    Review of Systems   Constitutional: Negative for activity change, chills, fatigue, fever, unexpected weight gain and unexpected weight loss.   HENT: Negative for congestion, hearing loss, postnasal drip, sinus pressure, sneezing, sore throat, swollen glands and tinnitus.    Eyes: Negative for photophobia, pain and visual disturbance.   Respiratory: Negative for cough, chest tightness, shortness of breath and wheezing.    Cardiovascular: Negative for chest pain, palpitations and leg swelling.   Gastrointestinal: Negative for abdominal distention, abdominal pain, constipation, diarrhea, nausea and vomiting.   Endocrine: Negative for polydipsia, polyphagia and polyuria.   Genitourinary: Negative for dysuria, frequency, hematuria and urgency.   Musculoskeletal: Positive for arthralgias (  right wrist).   Neurological: Negative for dizziness, weakness, numbness and headache.        Tingling, right fingertips   All other systems reviewed and are negative.      Objective    /90   Pulse 68   Temp 98.3 °F (36.8 °C)   Ht 175.3 cm (69.02\")   Wt 117 kg (258 " lb)   SpO2 98%   BMI 38.08 kg/m²     Physical Exam   Constitutional: He is oriented to person, place, and time. He appears well-developed and well-nourished. No distress.   HENT:   Head: Normocephalic and atraumatic.   Eyes: Pupils are equal, round, and reactive to light.   Neck: Normal range of motion. Neck supple.   Cardiovascular: Normal rate and regular rhythm.   Capillary refill less than 2 seconds, equal to bilateral upper extremities.  No erythema or heat.  Radial pulses 2+ equal bilaterally.   Pulmonary/Chest: Effort normal and breath sounds normal.   Lungs CTA bilat.   Abdominal: Soft. Bowel sounds are normal. He exhibits no distension. There is no tenderness.   Musculoskeletal: Normal range of motion.        Right wrist: He exhibits normal range of motion, no tenderness, no deformity and no laceration.   Neurological: He is alert and oriented to person, place, and time.   Skin: Capillary refill takes less than 2 seconds. He is not diaphoretic.   Psychiatric: He has a normal mood and affect. His behavior is normal.   Nursing note and vitals reviewed.    Current outpatient and discharge medications have been reconciled for the patient.  Reviewed by: PETERSON Leigh      Assessment/Plan   Kennedy was seen today for numbness and constipation.    Diagnoses and all orders for this visit:    Tingling  -     Magnesium  -     Vitamin B12    -I suspect carpal tunnel syndrome.  He has a history of this left-sided.  I wrote him a paper prescription to get a cock-up splint at the medical supply store and asked him to use this consistently.  We will check magnesium and B12 to rule out deficiency as contributor.    -We will contact patient with his lab results and any further recommendations.  Follow-up PRN if symptoms persist or worsen and at next scheduled office visit.

## 2020-09-11 LAB
MAGNESIUM SERPL-MCNC: 2.1 MG/DL (ref 1.6–2.6)
VIT B12 SERPL-MCNC: 858 PG/ML (ref 211–946)

## 2020-09-11 NOTE — PROGRESS NOTES
Please notify patient that his magnesium and vitamin B12 are normal.  Please use the cock-up splint consistently for the next 2 to 3 weeks and let me know if the numbness in his fingers persists or worsens.

## 2020-09-21 ENCOUNTER — TELEPHONE (OUTPATIENT)
Dept: GASTROENTEROLOGY | Facility: CLINIC | Age: 30
End: 2020-09-21

## 2020-09-21 NOTE — TELEPHONE ENCOUNTER
----- Message from Kennedy Meyer sent at 9/18/2020  3:03 PM EDT -----  Regarding: Prescription Question  Contact: [phone]  Shirley harrington. All churning, little amount of poop yield. Lol

## 2020-09-21 NOTE — TELEPHONE ENCOUNTER
Please call the patient and tell him that he needs to go back on Linzess 72 but take it every other day and see if that is better.  We can also give him some samples of Motegrity to trial if he would rather do that.

## 2020-10-08 ENCOUNTER — TELEPHONE (OUTPATIENT)
Dept: GASTROENTEROLOGY | Facility: CLINIC | Age: 30
End: 2020-10-08

## 2020-10-08 NOTE — TELEPHONE ENCOUNTER
----- Message from Kennedy Meyer sent at 10/8/2020  1:18 PM EDT -----  Regarding: Complaint  Contact: [phone]  Found an actual polyp looking specimen on the toilet paper after a BM. What should I do? I have a photo

## 2020-11-30 ENCOUNTER — OFFICE VISIT (OUTPATIENT)
Dept: GASTROENTEROLOGY | Facility: CLINIC | Age: 30
End: 2020-11-30

## 2020-11-30 VITALS — HEIGHT: 69 IN | WEIGHT: 262.4 LBS | BODY MASS INDEX: 38.86 KG/M2

## 2020-11-30 DIAGNOSIS — D12.6 ADENOMATOUS POLYP OF COLON, UNSPECIFIED PART OF COLON: ICD-10-CM

## 2020-11-30 DIAGNOSIS — K62.89 ANAL OR RECTAL PAIN: ICD-10-CM

## 2020-11-30 DIAGNOSIS — K58.1 IRRITABLE BOWEL SYNDROME WITH CONSTIPATION: Primary | ICD-10-CM

## 2020-11-30 DIAGNOSIS — K64.8 INTERNAL HEMORRHOIDS: ICD-10-CM

## 2020-11-30 PROCEDURE — 99214 OFFICE O/P EST MOD 30 MIN: CPT | Performed by: NURSE PRACTITIONER

## 2020-11-30 NOTE — PROGRESS NOTES
Chief Complaint   Patient presents with   • Abdominal Pain   • Irritable Bowel Syndrome   • Constipation       Kennedy Meyer is a  30 y.o. male here for a follow up visit for IBS.    HPI  1-year-old male presents today for follow-up visit for IBS.  He is a patient of Dr. Deras.  He was last seen in the office by me on 8/31/2020.  He has a history of IBS-C and admits he does better if he takes Linzess 72.  He does not take it daily because it causes too much diarrhea but he takes it every 3 to 4 days.  He has tried Amitiza, Trulance and Motegrity without relief.  He has tried over-the-counter measures none of which have worked as well as Linzess.  He did undergo a colonoscopy on 7/6/2020.  It did show some internal nonbleeding hemorrhoids as well as some colon polyps.  Path was positive for adenomatous colon polyps.  Recall in 5 years.  He still complains of episodes of intermittent rectal bleeding with rectal pain.  He feels like it is hemorrhoids.  He does use Anusol suppositories and he admits those do seem to help.  He denies any dysphagia, reflux, abdominal pain, nausea and vomiting, or melena.  He met his appetite is good and his weight has gone up 10 pounds since August.  He does admit that he uses marijuana routinely daily.  Past Medical History:   Diagnosis Date   • Irritable bowel syndrome        Past Surgical History:   Procedure Laterality Date   • COLONOSCOPY N/A 7/6/2020    Procedure: COLONOSCOPY to cecum and TI with cold polypectomy;  Surgeon: Jennifer Dumont MD;  Location: [facility] ENDOSCOPY;  Service: Gastroenterology;  Laterality: N/A;  pre - abnormal CT  post - recto-sigmoid inflammation, polyp, hemorrhoids   • LATERAL RECTUS RECESSION Right 11/06/2015   • TONSILLECTOMY AND ADENOIDECTOMY         Scheduled Meds:    Continuous Infusions:No current facility-administered medications for this visit.       PRN Meds:.    No Known Allergies    Social History     Socioeconomic History   • Marital status:  Single     Spouse name: Not on file   • Number of children: Not on file   • Years of education: Not on file   • Highest education level: Not on file   Tobacco Use   • Smoking status: Former Smoker     Packs/day: 0.25     Years: 5.00     Pack years: 1.25     Types: Cigarettes, Electronic Cigarette     Quit date: 2020     Years since quittin.2   • Smokeless tobacco: Never Used   • Tobacco comment: may 2020   Substance and Sexual Activity   • Alcohol use: Not Currently   • Drug use: Yes     Types: Marijuana     Comment: daily   • Sexual activity: Yes     Partners: Female     Birth control/protection: Condom       Family History   Problem Relation Age of Onset   • Diabetes Other         Borderline   • Hypertension Other        Review of Systems   Constitutional: Negative for appetite change, chills, diaphoresis, fatigue, fever and unexpected weight change.   HENT: Negative for nosebleeds, postnasal drip, sore throat, trouble swallowing and voice change.    Respiratory: Negative for cough, choking, chest tightness, shortness of breath and wheezing.    Cardiovascular: Negative for chest pain, palpitations and leg swelling.   Gastrointestinal: Positive for anal bleeding, constipation and rectal pain. Negative for abdominal distention, abdominal pain, blood in stool, diarrhea, nausea and vomiting.   Endocrine: Negative for polydipsia, polyphagia and polyuria.   Musculoskeletal: Negative for gait problem.   Skin: Negative for rash and wound.   Allergic/Immunologic: Negative for food allergies.   Neurological: Negative for dizziness, speech difficulty and light-headedness.   Psychiatric/Behavioral: Negative for confusion, self-injury, sleep disturbance and suicidal ideas.       There were no vitals filed for this visit.    Physical Exam  Constitutional:       General: He is not in acute distress.     Appearance: He is well-developed. He is not ill-appearing.   HENT:      Head: Normocephalic.   Eyes:      Pupils: Pupils  are equal, round, and reactive to light.   Cardiovascular:      Rate and Rhythm: Normal rate and regular rhythm.      Heart sounds: Normal heart sounds.   Pulmonary:      Effort: Pulmonary effort is normal.      Breath sounds: Normal breath sounds.   Abdominal:      General: Bowel sounds are normal. There is no distension.      Palpations: Abdomen is soft. There is no mass.      Tenderness: There is no abdominal tenderness. There is no guarding or rebound.      Hernia: No hernia is present.   Musculoskeletal: Normal range of motion.   Skin:     General: Skin is warm and dry.   Neurological:      Mental Status: He is alert and oriented to person, place, and time.   Psychiatric:         Speech: Speech normal.         Behavior: Behavior normal.         Judgment: Judgment normal.         No radiology results for the last 7 days     Assessment and plan     1. Irritable bowel syndrome with constipation    2. Anal or rectal pain    3. Internal hemorrhoids    4. Adenomatous polyp of colon, unspecified part of colon    IBS-C seems pretty stable on Linzess 72 every 3 to 4 days.  It does sound like his hemorrhoids are acting up.  Patient is agreeable to be referred to Dr. Jojo Allen for further evaluation.  For now want him to continue the Anusol suppositories per rectum every night as needed.  Continue sitz bath's.  Continue a high-fiber diet.  Patient to call the office with any issues.  Patient to follow-up with me in 3 months.  Patient is agreeable to the plan.

## 2020-12-03 ENCOUNTER — OFFICE VISIT (OUTPATIENT)
Dept: INTERNAL MEDICINE | Facility: CLINIC | Age: 30
End: 2020-12-03

## 2020-12-03 DIAGNOSIS — Z71.6 ENCOUNTER FOR SMOKING CESSATION COUNSELING: Primary | ICD-10-CM

## 2020-12-03 PROCEDURE — 99442 PR PHYS/QHP TELEPHONE EVALUATION 11-20 MIN: CPT | Performed by: NURSE PRACTITIONER

## 2020-12-03 RX ORDER — VARENICLINE TARTRATE 1 MG/1
1 TABLET, FILM COATED ORAL 2 TIMES DAILY
COMMUNITY
End: 2021-01-25

## 2020-12-03 NOTE — PROGRESS NOTES
"Subjective   Kennedy Meyer is a 30 y.o. male.   Chief Complaint   Patient presents with   • Nicotine Dependence     You have chosen to receive care through a telephone visit. Do you consent to use a telephone visit for your medical care today? Yes    Patient presents for telephone visit for follow-up on Chantix/smoking cessation.  This is a 30-year-old male.    I prescribed Chantix several times over the past year but patient states he has only just recently consistently begun.  He has been taking it consistently for the past 4 weeks.  Still having some intermittent cravings to smoke.  He has been off of cigarettes since starting the Chantix consistently 1 month ago.  He has more of an urge to smoke during stressful times but states \"sometimes I feel like it is just muscle memory.\"  He has not tried any other cessation methods at this point.  Denies development of any other new issues today.       The following portions of the patient's history were reviewed and updated as appropriate: allergies, current medications, past family history, past medical history, past social history, past surgical history and problem list.    Review of Systems   Constitutional: Negative for activity change, chills, fatigue, fever, unexpected weight gain and unexpected weight loss.   HENT: Negative for congestion, hearing loss, postnasal drip, sinus pressure, sneezing, sore throat, swollen glands and tinnitus.    Eyes: Negative for photophobia, pain and visual disturbance.   Respiratory: Negative for cough, chest tightness, shortness of breath and wheezing.    Cardiovascular: Negative for chest pain, palpitations and leg swelling.   Gastrointestinal: Negative for abdominal distention, abdominal pain, constipation, diarrhea, nausea and vomiting.   Endocrine: Negative for polydipsia, polyphagia and polyuria.   Genitourinary: Negative for dysuria, frequency, hematuria and urgency.   Neurological: Negative for dizziness, weakness, numbness " and headache.   All other systems reviewed and are negative.      Objective    There were no vitals filed for this visit.    Physical Exam  Neurological:      Mental Status: He is oriented to person, place, and time.       Current outpatient and discharge medications have been reconciled for the patient.  Reviewed by: PETERSON Leigh      Assessment/Plan   Diagnoses and all orders for this visit:    1. Encounter for smoking cessation counseling (Primary)    -Provided reassurance for patient.  He has only been taking the Chantix consistently for the past 4 weeks.  He has not smoked although still having some intermittent cravings.  I discussed stress reduction techniques as he seems to want to smoke more during stress filled times.    -He is tolerating the Chantix well with no side effects.    -Advised that we will continue the Chantix continuing pack for 12 to 24 weeks.    -We will check in in January at his previously scheduled health maintenance appointment in the office.  Follow-up as needed in the meantime.    This visit was conducted via telephone therefore no physical exam was performed.    This visit has been rescheduled as a phone visit to comply with patient safety concerns in accordance with CDC recommendations. Total time of discussion was 15 minutes.

## 2021-01-25 ENCOUNTER — OFFICE VISIT (OUTPATIENT)
Dept: INTERNAL MEDICINE | Facility: CLINIC | Age: 31
End: 2021-01-25

## 2021-01-25 VITALS
SYSTOLIC BLOOD PRESSURE: 124 MMHG | DIASTOLIC BLOOD PRESSURE: 78 MMHG | HEIGHT: 69 IN | WEIGHT: 260 LBS | BODY MASS INDEX: 38.51 KG/M2 | TEMPERATURE: 98.2 F | OXYGEN SATURATION: 98 % | HEART RATE: 68 BPM

## 2021-01-25 DIAGNOSIS — Z13.6 ENCOUNTER FOR LIPID SCREENING FOR CARDIOVASCULAR DISEASE: ICD-10-CM

## 2021-01-25 DIAGNOSIS — K64.4 EXTERNAL HEMORRHOID: ICD-10-CM

## 2021-01-25 DIAGNOSIS — Z00.00 ANNUAL PHYSICAL EXAM: Primary | ICD-10-CM

## 2021-01-25 DIAGNOSIS — K59.09 CHRONIC CONSTIPATION: ICD-10-CM

## 2021-01-25 DIAGNOSIS — Z00.00 HEALTHCARE MAINTENANCE: ICD-10-CM

## 2021-01-25 DIAGNOSIS — E55.9 HYPOVITAMINOSIS D: ICD-10-CM

## 2021-01-25 DIAGNOSIS — E66.09 EXOGENOUS OBESITY: ICD-10-CM

## 2021-01-25 DIAGNOSIS — Z13.220 ENCOUNTER FOR LIPID SCREENING FOR CARDIOVASCULAR DISEASE: ICD-10-CM

## 2021-01-25 PROBLEM — T14.8XXA TENDON TEAR: Status: RESOLVED | Noted: 2019-08-02 | Resolved: 2021-01-25

## 2021-01-25 PROBLEM — R10.84 GENERALIZED ABDOMINAL PAIN: Status: RESOLVED | Noted: 2020-02-24 | Resolved: 2021-01-25

## 2021-01-25 PROBLEM — R10.30 LOWER ABDOMINAL PAIN: Status: RESOLVED | Noted: 2020-06-09 | Resolved: 2021-01-25

## 2021-01-25 LAB
25(OH)D3+25(OH)D2 SERPL-MCNC: 32.4 NG/ML (ref 30–100)
ALBUMIN SERPL-MCNC: 4.3 G/DL (ref 3.5–5.2)
ALBUMIN/GLOB SERPL: 2 G/DL
ALP SERPL-CCNC: 96 U/L (ref 39–117)
ALT SERPL-CCNC: 27 U/L (ref 1–41)
AST SERPL-CCNC: 23 U/L (ref 1–40)
BILIRUB SERPL-MCNC: 0.2 MG/DL (ref 0–1.2)
BUN SERPL-MCNC: 11 MG/DL (ref 6–20)
BUN/CREAT SERPL: 14.5 (ref 7–25)
CALCIUM SERPL-MCNC: 9.1 MG/DL (ref 8.6–10.5)
CHLORIDE SERPL-SCNC: 105 MMOL/L (ref 98–107)
CHOLEST SERPL-MCNC: 167 MG/DL (ref 0–200)
CO2 SERPL-SCNC: 23.1 MMOL/L (ref 22–29)
CREAT SERPL-MCNC: 0.76 MG/DL (ref 0.76–1.27)
ERYTHROCYTE [DISTWIDTH] IN BLOOD BY AUTOMATED COUNT: 11.5 % (ref 12.3–15.4)
GLOBULIN SER CALC-MCNC: 2.2 GM/DL
GLUCOSE SERPL-MCNC: 87 MG/DL (ref 65–99)
HCT VFR BLD AUTO: 43.5 % (ref 37.5–51)
HDLC SERPL-MCNC: 34 MG/DL (ref 40–60)
HGB BLD-MCNC: 15.4 G/DL (ref 13–17.7)
LDLC SERPL CALC-MCNC: 92 MG/DL (ref 0–100)
MCH RBC QN AUTO: 34.6 PG (ref 26.6–33)
MCHC RBC AUTO-ENTMCNC: 35.4 G/DL (ref 31.5–35.7)
MCV RBC AUTO: 97.8 FL (ref 79–97)
PLATELET # BLD AUTO: 231 10*3/MM3 (ref 140–450)
POTASSIUM SERPL-SCNC: 4.1 MMOL/L (ref 3.5–5.2)
PROT SERPL-MCNC: 6.5 G/DL (ref 6–8.5)
RBC # BLD AUTO: 4.45 10*6/MM3 (ref 4.14–5.8)
SODIUM SERPL-SCNC: 137 MMOL/L (ref 136–145)
TRIGL SERPL-MCNC: 245 MG/DL (ref 0–150)
TSH SERPL DL<=0.005 MIU/L-ACNC: 0.89 UIU/ML (ref 0.27–4.2)
VLDLC SERPL CALC-MCNC: 41 MG/DL (ref 5–40)
WBC # BLD AUTO: 7.77 10*3/MM3 (ref 3.4–10.8)

## 2021-01-25 PROCEDURE — 99395 PREV VISIT EST AGE 18-39: CPT | Performed by: NURSE PRACTITIONER

## 2021-01-25 NOTE — ASSESSMENT & PLAN NOTE
Continue 50,000 units weekly of vitamin D.  Vitamin D level today and we will adjust therapy if needed.

## 2021-01-25 NOTE — PROGRESS NOTES
"Chief Complaint  Annual Exam (Physical)    Subjective          Kennedy Meyer presents to [facility] for   Patient presents for annual physical. This is a 30 YOM.     He is a former smoker- reports no cigarettes since November 2020 with the help of Chantix. Reports no longer taking the Chantix and has quit smoking altogether.     He has chronic constipation and external hemorrhoids and takes Linzess as well as a daily probiotic. He sees PETERSON Ibrahim with GI routinely and is seeing Dr. Saundra Allen, colorectal surgery next month for evaluation of the hemorrhoids.     He has vitamin D deficiency and takes 50,000 units weekly of vitamin D reporting good compliance with this medication.    Reports that he is up-to-date on dental and vision exams.  Endorses a well-balanced diet generally.  He does not exercise routinely throughout the week.  Drinks alcohol monthly or less, not in excess.    Reports that he is doing well overall and denies development of any other new issues today.      Objective   Vital Signs:   /78 (BP Location: Left arm, Patient Position: Sitting, Cuff Size: Adult)   Pulse 68   Temp 98.2 °F (36.8 °C)   Ht 175.3 cm (69\")   Wt 118 kg (260 lb)   SpO2 98%   BMI 38.40 kg/m²     Physical Exam  Vitals signs and nursing note reviewed.   Constitutional:       General: He is not in acute distress.     Appearance: Normal appearance. He is well-developed. He is obese. He is not ill-appearing, toxic-appearing or diaphoretic.   HENT:      Head: Normocephalic and atraumatic.      Right Ear: Tympanic membrane, ear canal and external ear normal.      Left Ear: Tympanic membrane, ear canal and external ear normal.   Eyes:      Pupils: Pupils are equal, round, and reactive to light.   Neck:      Musculoskeletal: Normal range of motion and neck supple. No neck rigidity or muscular tenderness.      Vascular: No carotid bruit.   Cardiovascular:      Rate and Rhythm: " Normal rate and regular rhythm.      Pulses: Normal pulses.      Heart sounds: Normal heart sounds.      Comments: No peripheral edema  Pulmonary:      Effort: Pulmonary effort is normal. No respiratory distress.      Breath sounds: Normal breath sounds. No stridor. No wheezing, rhonchi or rales.   Chest:      Chest wall: No tenderness.   Abdominal:      General: Bowel sounds are normal. There is no distension.      Palpations: Abdomen is soft. There is no mass.      Tenderness: There is no abdominal tenderness. There is no right CVA tenderness, left CVA tenderness, guarding or rebound.      Hernia: No hernia is present.   Musculoskeletal: Normal range of motion.   Lymphadenopathy:      Cervical: No cervical adenopathy.   Skin:     General: Skin is warm and dry.      Capillary Refill: Capillary refill takes less than 2 seconds.   Neurological:      General: No focal deficit present.      Mental Status: He is alert and oriented to person, place, and time. Mental status is at baseline.   Psychiatric:         Mood and Affect: Mood normal.         Behavior: Behavior normal.         Thought Content: Thought content normal.         Judgment: Judgment normal.        Result Review :   The following data was reviewed by: PETERSON Leigh on 01/25/2021:  Common labs    Common Labsle 6/7/20 6/7/20 7/17/20 7/17/20 7/17/20    0316 0316 [id number]   Glucose  120 (A)  88    BUN  8  11    Creatinine  0.9  0.80    eGFR Non  Am    113    eGFR African Am    137    Sodium  139  140    Potassium  3.5  4.3    Chloride  108 (A)  103    Calcium  9.7  9.8    Total Protein    7.1    Albumin  4.6  4.80    Total Bilirubin  <0.1 (A)  0.2    Alkaline Phosphatase  133 (A)  93    AST (SGOT)  29  22    ALT (SGPT)  36  30    WBC 9.98  8.03     Hemoglobin 14.9  15.6     Hematocrit 42.3  44.5     Platelets 219  228     Total Cholesterol     160   Triglycerides     113   HDL Cholesterol     41   LDL Cholesterol      96   (A) Abnormal value        Comments are available for some flowsheets but are not being displayed.           Data reviewed: Consultant notes PETERSON Ibrahim GI consult notes          Assessment and Plan    Problem List Items Addressed This Visit        Endocrine and Metabolic    Exogenous obesity (Chronic)    Current Assessment & Plan     Discussed increasing physical activity for goal of 30 minutes/day at least 5 days/week.    Discussed dietary modifications including decreasing portion size, carbs, sugars and fats in the diet.         Hypovitaminosis D (Chronic)    Current Assessment & Plan     Continue 50,000 units weekly of vitamin D.  Vitamin D level today and we will adjust therapy if needed.         Relevant Orders    Vitamin D 25 hydroxy       Gastrointestinal Abdominal     Chronic constipation (Chronic)    Overview     Added automatically from request for surgery [id number]         Current Assessment & Plan     Following routinely with GI.  I discussed adding a fiber supplement.  Continue daily probiotic and Linzess as needed.         Relevant Orders    CBC No Differential    Comprehensive metabolic panel    TSH Rfx On Abnormal To Free T4       Health Encounters    Healthcare maintenance (Chronic)    Current Assessment & Plan     Refuses flu shot despite education.    Monthly testicular self exams recommended.         Relevant Orders    CBC No Differential    Comprehensive metabolic panel    TSH Rfx On Abnormal To Free T4      Other Visit Diagnoses     Annual physical exam    -  Primary    Encounter for lipid screening for cardiovascular disease        Relevant Orders    Lipid panel    External hemorrhoid        He can continue Anusol suppositories as needed.  He sees , colorectal specialist next month.        We will contact patient with his lab results and any further recommendations.  Follow-up as needed and I will see him back in 6 months for recheck of chronic conditions/routine health maintenance.    Follow Up    Return in about 6 months (around 7/25/2021).  Patient was given instructions and counseling regarding his condition or for health maintenance advice. Please see specific information pulled into the AVS if appropriate.

## 2021-01-25 NOTE — ASSESSMENT & PLAN NOTE
Discussed increasing physical activity for goal of 30 minutes/day at least 5 days/week.    Discussed dietary modifications including decreasing portion size, carbs, sugars and fats in the diet.

## 2021-01-25 NOTE — ASSESSMENT & PLAN NOTE
Following routinely with GI.  I discussed adding a fiber supplement.  Continue daily probiotic and Linzess as needed.

## 2021-01-26 NOTE — PROGRESS NOTES
Good morning Mr. Meyer, your labs are back.  Your blood count looks stable with no anemia and normal white blood cells.  Metabolic panel is perfect including kidney, liver function and electrolytes.  Cholesterol panel looks stable except for triglycerides which are quite high at 245.  I recommend starting over-the-counter omega-3 1000 mg fish oil daily to help bring this down along with diet, exercise size and decreasing fats and carbs in the diet.  Thyroid is normal.  Vitamin D is normal but on the low end of normal and I recommend continuing with the 50,000 units weekly of vitamin D.  Let me know if you have any questions or concerns and we will recheck routinely.  Have a great day,    PETERSON Leigh

## 2021-02-05 ENCOUNTER — TELEPHONE (OUTPATIENT)
Dept: GASTROENTEROLOGY | Facility: CLINIC | Age: 31
End: 2021-02-05

## 2021-02-05 NOTE — TELEPHONE ENCOUNTER
----- Message from Kennedy Meyer sent at 2/4/2021  4:43 PM EST -----  Regarding: Prescription Question  Contact: [phone]  Have anything other than linzess and not the one that hurts my kidneys? Insurance changed and out of pocket cost is $475.

## 2021-02-05 NOTE — TELEPHONE ENCOUNTER
Cant remember which one hurt his kidneys?  Let him know there is amitiza, Trulance, Motegrity which are the prescriptions.  He can also do magnesium supplements at night, or may take MiraLAX over-the-counter or even lactulose liquid?

## 2021-02-08 NOTE — TELEPHONE ENCOUNTER
Called pt and advised of Donna's note. Verb understanding.     Pt states he will give the miralax and magnesium a try . Pt will call us back if not successful. Update sent to Lissy NP>

## 2021-02-10 ENCOUNTER — TELEPHONE (OUTPATIENT)
Dept: GASTROENTEROLOGY | Facility: CLINIC | Age: 31
End: 2021-02-10

## 2021-02-10 NOTE — TELEPHONE ENCOUNTER
----- Message from Kennedy Meyer sent at 2/10/2021  8:41 AM EST -----  Regarding: Visit Follow-Up Question  Contact: [phone]  Good morning Lissy, since I've started having these medical issues, I've missed like 10 days of work due to the excruciating pain, Monday was no different. Would you or would my PCP fill out FMLA for me, for when these cause me loss of work?

## 2021-02-10 NOTE — TELEPHONE ENCOUNTER
Called pt advised of Lissy's note. Pt verb understanding and states he will email us the forms thru his my chart.

## 2021-02-10 NOTE — TELEPHONE ENCOUNTER
He can drop the forms off here and Lynne can fill them out.  Do we charge for that?  I am thinking we charged for that.  If he is okay with that.  Either his PCP or we can do it.

## 2021-02-11 ENCOUNTER — TELEPHONE (OUTPATIENT)
Dept: GASTROENTEROLOGY | Facility: CLINIC | Age: 31
End: 2021-02-11

## 2021-02-11 NOTE — TELEPHONE ENCOUNTER
----- Message from Kennedy Meyer sent at 2/10/2021  3:46 PM EST -----  Regarding: Visit Follow-Up Question  Contact: [phone]  I've attached the [employer] docs

## 2021-02-16 ENCOUNTER — TELEPHONE (OUTPATIENT)
Dept: GASTROENTEROLOGY | Facility: CLINIC | Age: 31
End: 2021-02-16

## 2021-02-16 NOTE — TELEPHONE ENCOUNTER
----- Message from Kennedy Meyer sent at 2/13/2021 11:39 PM EST -----  Regarding: Visit Follow-Up Question  Contact: [phone]  This is also a symptom. Can I please have a colon and prostate cancer evaluation.

## 2021-02-18 NOTE — TELEPHONE ENCOUNTER
Called pt and advised of Lissy's note. Pt verb understanding and made appt with Dr Dumont for 03/02 at 2p.

## 2021-02-18 NOTE — TELEPHONE ENCOUNTER
Patient just had a colonoscopy last year and is not due another one for another 4 years.  We do not evaluate the prostate.  That would be something he would need to do by his PCP or urology.  He does have a history of IBS-C and that can change his stools.  Especially if he is not going regularly.  I think the patient at this point should come in and see Dr. Dumont to discuss all these issues further.  Thanks

## 2021-02-19 ENCOUNTER — OFFICE VISIT (OUTPATIENT)
Dept: SURGERY | Facility: CLINIC | Age: 31
End: 2021-02-19

## 2021-02-19 VITALS
HEART RATE: 85 BPM | TEMPERATURE: 96.4 F | HEIGHT: 69 IN | DIASTOLIC BLOOD PRESSURE: 74 MMHG | SYSTOLIC BLOOD PRESSURE: 126 MMHG | OXYGEN SATURATION: 99 % | WEIGHT: 251.9 LBS | BODY MASS INDEX: 37.31 KG/M2

## 2021-02-19 DIAGNOSIS — K59.00 CONSTIPATION, UNSPECIFIED CONSTIPATION TYPE: Primary | ICD-10-CM

## 2021-02-19 DIAGNOSIS — K64.8 INTERNAL HEMORRHOIDS WITHOUT COMPLICATION: ICD-10-CM

## 2021-02-19 PROCEDURE — 99204 OFFICE O/P NEW MOD 45 MIN: CPT | Performed by: COLON & RECTAL SURGERY

## 2021-02-19 PROCEDURE — 46600 DIAGNOSTIC ANOSCOPY SPX: CPT | Performed by: COLON & RECTAL SURGERY

## 2021-02-19 RX ORDER — CHLORAL HYDRATE 500 MG
1 CAPSULE ORAL DAILY
COMMUNITY

## 2021-02-19 RX ORDER — HYDROCORTISONE 25 MG/G
CREAM TOPICAL
Qty: 30 G | Refills: 1 | Status: SHIPPED | OUTPATIENT
Start: 2021-02-19

## 2021-02-19 NOTE — PROGRESS NOTES
"Kennedy Meyer is a 30 y.o. male who is seen as a consult at the request of KIRILL De Anda* for Hemorrhoids.    HPI:hem - hurts for 4-6 hrs if straining.  \"Not making bm without help\"  linzess $$  No linzess for 2 wks  Fiber , probiotic, mtv, miralax.  No bleeding with hem  Small pencil like bm  Job changed insurance - and selected different insurance and now is in a high deductible plan  Insurance is \"conspiracy and hoax\"  No cream.   supp rx - did not use \"right\".  He states he only used it when it hurts.  Used it once or twice.  Not ever in a row.    Past Medical History:   Diagnosis Date   • Abnormal CT scan, colon 06/09/2020   • Anal pain 11/2020   • Chronic constipation 06/09/2020   • Chronic midline low back pain without sciatica 12/7/2016   • Colon polyps     FOLLOWED BY DR. MARSHA LARRY   • De Quervain's disease (tenosynovitis) 08/2019    FOLLOWED BY DR. 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/2017   • Hypovitaminosis D 12/7/2016   • Impacted cerumen    • Impacted cerumen of right ear 4/5/2017   • Irritable bowel syndrome    • Lazy eye 12/07/2016    RIGHT EYE, S/P MUSCLE RESECTION   • Left wrist pain 07/2019   • Lumbar radiculopathy 12/7/2016   • Pharyngitis 06/19/2019    SEEN AT [facility]   • Right foot pain 08/2020   • Scrotal wall abscess 12/7/2016   • Tendon tear 08/2019    LEFT THUMB       Past Surgical History:   Procedure Laterality Date   • COLONOSCOPY N/A 7/6/2020    4 MM TUBULAR ADENOMA POLYP IN RECTUM, RANDOM COLON BIOPSIES BENIGN, SMALL HEMORRHOIDS, DR. MARSHA LARRY AT [facility]   • LATERAL RECTUS RECESSION Right 11/06/2015    AND RESECTION OF RIGHT MEDIAL RECTUS MUSCLE, DR. FRANCY HERBERT AT [facility]   • TONSILLECTOMY AND ADENOIDECTOMY Bilateral     DURING CHILDHOOD   • WISDOM TOOTH EXTRACTION Bilateral  "       Social History:   reports that he has been smoking cigarettes and electronic cigarette. He has a 1.25 pack-year smoking history. He has never used smokeless tobacco. He reports current alcohol use. He reports current drug use. Drug: Marijuana.      Marriage status: Single    Family History   Problem Relation Age of Onset   • Diabetes Mother    • Hypertension Father          Current Outpatient Medications:   •  Multiple Vitamins-Minerals (CENTRUM MEN PO), Take  by mouth., Disp: , Rfl:   •  Omega-3 1000 MG capsule, Take 1 capsule by mouth Daily., Disp: , Rfl:   •  Probiotic Product (PROBIOTIC DAILY PO), Take  by mouth., Disp: , Rfl:   •  vitamin D (ERGOCALCIFEROL) 1.25 MG (26032 UT) capsule capsule, Take 1 capsule by mouth 1 (One) Time Per Week., Disp: 12 capsule, Rfl: 1  •  Hydrocortisone, Perianal, (Anusol-HC) 2.5 % rectal cream, Apply rectally 3 times daily.  Include applicator., Disp: 30 g, Rfl: 1  •  linaclotide (Linzess) 72 MCG capsule capsule, Take 1 capsule by mouth Every Morning Before Breakfast., Disp: 30 capsule, Rfl: 5    Allergy  Suture    Review of Systems   Constitution: Positive for decreased appetite and weight gain.   HENT: Positive for congestion. Negative for hearing loss and hoarse voice.    Eyes: Positive for discharge. Negative for blurred vision and visual disturbance.   Cardiovascular: Negative for chest pain, cyanosis and leg swelling.   Respiratory: Positive for cough, shortness of breath and sleep disturbances due to breathing. Negative for snoring.    Endocrine: Negative for cold intolerance and heat intolerance.   Hematologic/Lymphatic: Does not bruise/bleed easily.   Skin: Negative for itching, poor wound healing and skin cancer.   Musculoskeletal: Positive for back pain and joint pain. Negative for arthritis and joint swelling.   Gastrointestinal: Positive for abdominal pain, change in bowel habit, bowel incontinence and constipation.   Genitourinary: Negative for bladder  incontinence, dysuria and hematuria.   Neurological: Positive for headaches. Negative for brief paralysis, excessive daytime sleepiness, dizziness, focal weakness, light-headedness and weakness.   Psychiatric/Behavioral: Negative for altered mental status and hallucinations. The patient does not have insomnia.    Allergic/Immunologic: Negative for HIV exposure and persistent infections.   All other systems reviewed and are negative.      Vitals:    02/19/21 0838   BP: 126/74   Pulse: 85   Temp: 96.4 °F (35.8 °C)   SpO2: 99%     Body mass index is 37.2 kg/m².    Physical Exam  Exam conducted with a chaperone present.   Constitutional:       General: He is not in acute distress.     Appearance: He is well-developed.   HENT:      Head: Normocephalic and atraumatic.      Nose: Nose normal.   Eyes:      Conjunctiva/sclera: Conjunctivae normal.      Pupils: Pupils are equal, round, and reactive to light.   Neck:      Musculoskeletal: Normal range of motion.      Trachea: No tracheal deviation.   Pulmonary:      Effort: Pulmonary effort is normal. No respiratory distress.      Breath sounds: Normal breath sounds.   Abdominal:      General: Bowel sounds are normal. There is no distension.      Palpations: Abdomen is soft.   Genitourinary:     Comments: Perianal exam: external hem -minor  SADIQ-good tone, no masses  Anoscopy performed:  Grade 1 x 3 internal hem    Musculoskeletal: Normal range of motion.         General: No deformity.   Skin:     General: Skin is warm and dry.   Neurological:      Mental Status: He is alert and oriented to person, place, and time.      Cranial Nerves: No cranial nerve deficit.      Coordination: Coordination normal.      Gait: Gait normal.   Psychiatric:         Behavior: Behavior normal.         Judgment: Judgment normal.       Review of Medical Record:  I reviewed GIs notes    Assessment:  1. Constipation, unspecified constipation type    2. Internal hemorrhoids without complication         Plan: Discussed with patient the constipation seems to be the root of his problem.  He needs to continue to discuss this with GI.  He states he has not told him that the Linzess is too expensive for him.  Discussed with patient if his constipation gets so extreme we can work him up for colonic inertia.  The surgery is to remove the entire colon and to connect the small bowel to the rectum.  This would be a change in bowel movements to 4-6 a day..  With that he said that is a conspiracy and made up surgery.  For the hemorrhoids you are not surgical.  Discussed with patient that he should continue with the fiber and stool softeners.  I gave him prescription for hemorrhoidal hydrocortisone cream which should be cheaper.  Follow-up as needed

## 2021-03-02 ENCOUNTER — OFFICE VISIT (OUTPATIENT)
Dept: GASTROENTEROLOGY | Facility: CLINIC | Age: 31
End: 2021-03-02

## 2021-03-02 VITALS
BODY MASS INDEX: 37.77 KG/M2 | WEIGHT: 255 LBS | DIASTOLIC BLOOD PRESSURE: 75 MMHG | HEIGHT: 69 IN | SYSTOLIC BLOOD PRESSURE: 130 MMHG

## 2021-03-02 DIAGNOSIS — R10.30 LOWER ABDOMINAL PAIN: Chronic | ICD-10-CM

## 2021-03-02 DIAGNOSIS — K64.8 OTHER HEMORRHOIDS: ICD-10-CM

## 2021-03-02 DIAGNOSIS — K59.09 CHRONIC CONSTIPATION: Primary | Chronic | ICD-10-CM

## 2021-03-02 PROCEDURE — 99214 OFFICE O/P EST MOD 30 MIN: CPT | Performed by: INTERNAL MEDICINE

## 2021-03-02 NOTE — PROGRESS NOTES
Chief Complaint   Patient presents with   • Irritable Bowel Syndrome   • Constipation   • Abdominal Pain       Subjective     HPI    Kennedy Meyer is a 30 y.o. male with a past medical history noted below who presents for follow-up of constipation, abdominal pain, hemorrhoids.    He was taking 72 mcg of Linzess daily with relief of symptoms.  However, his insurance changed in February.  Linzess became too expensive so he had to stop it.  Now taking a daily fiber supplement.  Drinking plenty of water.  Having a daily BM.  Takes MiraLAX if he feels things slowing up too much.    When he does get constipated, he gets lower suprapubic abdominal pain.  He is concerned that it is in his prostate or testicles.  He voices concerns about testicular cancer.    Saw  for hemorrhoids.  No surgery was recommended.    Today's visit was in the office.  Both the patient and I were wearing face masks and proper hand hygiene was performed before and after the physical exam.           Current Outpatient Medications:   •  Hydrocortisone, Perianal, (Anusol-HC) 2.5 % rectal cream, Apply rectally 3 times daily.  Include applicator., Disp: 30 g, Rfl: 1  •  Multiple Vitamins-Minerals (CENTRUM MEN PO), Take  by mouth., Disp: , Rfl:   •  Omega-3 1000 MG capsule, Take 1 capsule by mouth Daily., Disp: , Rfl:   •  Probiotic Product (PROBIOTIC DAILY PO), Take  by mouth., Disp: , Rfl:   •  Psyllium (METAMUCIL FIBER PO), Take  by mouth., Disp: , Rfl:   •  vitamin D (ERGOCALCIFEROL) 1.25 MG (49344 UT) capsule capsule, Take 1 capsule by mouth 1 (One) Time Per Week., Disp: 12 capsule, Rfl: 1  •  linaclotide (Linzess) 72 MCG capsule capsule, Take 1 capsule by mouth Every Morning Before Breakfast., Disp: 30 capsule, Rfl: 5      Objective     Vitals:    03/02/21 1349   BP: 130/75         03/02/21  1349   Weight: 116 kg (255 lb)     Body mass index is 37.66 kg/m².    Physical Exam  Vitals signs reviewed.   Constitutional:       General: He is not  in acute distress.     Appearance: Normal appearance. He is well-developed. He is not diaphoretic.   HENT:      Head: Normocephalic.   Neck:      Thyroid: No thyromegaly.   Cardiovascular:      Rate and Rhythm: Normal rate and regular rhythm.   Pulmonary:      Effort: Pulmonary effort is normal. No respiratory distress.      Breath sounds: Normal breath sounds. No wheezing.   Abdominal:      General: Bowel sounds are normal. There is no distension.      Palpations: Abdomen is soft. Abdomen is not rigid. There is no mass.      Tenderness: There is no abdominal tenderness. There is no guarding or rebound.      Hernia: No hernia is present.      Comments: obese   Genitourinary:     Comments: Rectal exam deferred  Musculoskeletal:         General: No tenderness.   Neurological:      Mental Status: He is alert and oriented to person, place, and time.      Motor: No atrophy.      Coordination: Coordination normal.   Psychiatric:         Behavior: Behavior normal.         Thought Content: Thought content normal.         Judgment: Judgment normal.             WBC   Date Value Ref Range Status   01/25/2021 7.77 3.40 - 10.80 10*3/mm3 Final   06/07/2020 9.98 4.5 - 11.0 10*3/uL Final     RBC   Date Value Ref Range Status   01/25/2021 4.45 4.14 - 5.80 10*6/mm3 Final   06/07/2020 4.32 (L) 4.5 - 5.9 10*6/uL Final     Hemoglobin   Date Value Ref Range Status   01/25/2021 15.4 13.0 - 17.7 g/dL Final   06/07/2020 14.9 13.5 - 17.5 g/dL Final     Hematocrit   Date Value Ref Range Status   01/25/2021 43.5 37.5 - 51.0 % Final   06/07/2020 42.3 41.0 - 53.0 % Final     MCV   Date Value Ref Range Status   01/25/2021 97.8 (H) 79.0 - 97.0 fL Final   06/07/2020 97.9 80.0 - 100.0 fL Final     MCH   Date Value Ref Range Status   01/25/2021 34.6 (H) 26.6 - 33.0 pg Final   06/07/2020 34.5 (H) 26.0 - 34.0 pg Final     MCHC   Date Value Ref Range Status   01/25/2021 35.4 31.5 - 35.7 g/dL Final   06/07/2020 35.2 31.0 - 37.0 g/dL Final     RDW   Date  Value Ref Range Status   01/25/2021 11.5 (L) 12.3 - 15.4 % Final   06/07/2020 11.9 (L) 12.0 - 16.8 % Final     MPV   Date Value Ref Range Status   06/07/2020 11.6 (H) 6.7 - 10.8 fL Final     Platelets   Date Value Ref Range Status   01/25/2021 231 140 - 450 10*3/mm3 Final   06/07/2020 219 140 - 440 10*3/uL Final     Neutrophil Rel %   Date Value Ref Range Status   06/07/2020 79.1 45 - 80 % Final     Lymphocyte Rel %   Date Value Ref Range Status   06/07/2020 16.0 15 - 50 % Final     Monocyte Rel %   Date Value Ref Range Status   06/07/2020 4.0 0 - 15 % Final     Eosinophil %   Date Value Ref Range Status   06/07/2020 0.3 0 - 7 % Final     Basophil Rel %   Date Value Ref Range Status   06/07/2020 0.3 0 - 2 % Final     Immature Grans %   Date Value Ref Range Status   06/07/2020 0.3 (H) 0 % Final     Neutrophils Absolute   Date Value Ref Range Status   06/07/2020 7.89 2.0 - 8.8 10*3/uL Final     Lymphocytes Absolute   Date Value Ref Range Status   06/07/2020 1.60 0.7 - 5.5 10*3/uL Final     Monocytes Absolute   Date Value Ref Range Status   06/07/2020 0.40 0.0 - 1.7 10*3/uL Final     Eosinophils Absolute   Date Value Ref Range Status   06/07/2020 0.03 0.0 - 0.8 10*3/uL Final     Basophils Absolute   Date Value Ref Range Status   06/07/2020 0.03 0.0 - 0.2 10*3/uL Final     Immature Grans, Absolute   Date Value Ref Range Status   06/07/2020 0.03 <1 10*3/uL Final     nRBC   Date Value Ref Range Status   06/07/2020 0 0 /100(WBC) Final       Lab Results   Component Value Date    BUN 11 01/25/2021    CREATININE 0.76 01/25/2021    EGFRIFNONA 119 01/25/2021    EGFRIFAFRI 144 01/25/2021    BCR 14.5 01/25/2021    CO2 23.1 01/25/2021    CALCIUM 9.1 01/25/2021    PROTENTOTREF 6.5 01/25/2021    ALBUMIN 4.30 01/25/2021    LABIL2 2.0 01/25/2021    AST 23 01/25/2021    ALT 27 01/25/2021       EXAMINATION: CT ABDOMEN AND PELVIS W IV CONTRAST    DATE: 06/18/2020    COMPARISON: June 7, 2020    INDICATION: ABD PAIN. Previous findings on  CT suspicious for colonic wall thickening.    TECHNIQUE:  Helical CT of the abdomen and pelvis was performed following the administration of oral contrast and during an uneventful injection of 100 ml of Isovue-370 low osmolar, nonionic contrast media.     CT scans at this facility use dose modulation, iterative reconstruction and/or weight based dosing when appropriate to reduce radiation dose to as low as reasonably achievable    FINDINGS:    Abdomen:  Limited evaluation of the lung bases is unremarkable. There is no pericardial or pleural effusion.    The gallbladder is unremarkable. The liver, spleen, pancreas, adrenal glands, and kidneys are within normal limits. There is no sign of nephrolithiasis and no biliary or ureteral dilation.        Pelvis:   The urinary bladder demonstrates smooth contours with no focal abnormality. The alimentary tract is unremarkable by CT criteria. Specifically, there is no sign of colonic wall thickening or inflammatory changes. There is no sign of ascites or free   intraperitoneal air..        IMPRESSION:  Unremarkable contrast-enhanced CT of the abdomen and pelvis.     Dictated by: ESPERANZA Morgan M.D.      I personally reviewed data as detailed below:     The labs listed above.    The radiology studies listed above.    Office notes from  from 2/19/2021    Endoscopy procedures and pathology from: 7/2020 colonoscopy      No notes on file    Assessment/Plan   1.  Chronic constipation: Linzess no longer affordable.  He is doing well on a daily fiber supplement, as needed MiraLAX    2.  Lower abdominal pain: With worsened constipation.  Recent reassuring imaging    3.  History of hemorrhoids: Just evaluated by     Plan  Continue fiber supplementation  Encouraged more regular use of MiraLAX to prevent constipation and abdominal pain  Samples of low-dose Linzess given for him to use if he starts to get more severely constipated  Advised that he follow-up with his  PCP regarding testicular exam/prostate exam    Diagnoses and all orders for this visit:    1. Chronic constipation (Primary)    2. Lower abdominal pain    3. Other hemorrhoids        I have discussed the above plan with the patient.  They verbalize understanding and are in agreement with the plan.  They have been advised to contact the office for any questions, concerns, or changes related to their health.    Dictated utilizing Dragon dictation

## 2021-04-16 ENCOUNTER — BULK ORDERING (OUTPATIENT)
Dept: CASE MANAGEMENT | Facility: OTHER | Age: 31
End: 2021-04-16

## 2021-04-16 DIAGNOSIS — Z23 IMMUNIZATION DUE: ICD-10-CM

## 2021-07-19 ENCOUNTER — OFFICE VISIT (OUTPATIENT)
Dept: GASTROENTEROLOGY | Facility: CLINIC | Age: 31
End: 2021-07-19

## 2021-07-19 VITALS
WEIGHT: 255 LBS | HEIGHT: 69 IN | DIASTOLIC BLOOD PRESSURE: 72 MMHG | BODY MASS INDEX: 37.77 KG/M2 | SYSTOLIC BLOOD PRESSURE: 130 MMHG

## 2021-07-19 DIAGNOSIS — D12.6 ADENOMATOUS POLYP OF COLON, UNSPECIFIED PART OF COLON: ICD-10-CM

## 2021-07-19 DIAGNOSIS — K59.09 CHRONIC CONSTIPATION: Primary | Chronic | ICD-10-CM

## 2021-07-19 DIAGNOSIS — K58.1 IRRITABLE BOWEL SYNDROME WITH CONSTIPATION: ICD-10-CM

## 2021-07-19 DIAGNOSIS — K64.8 OTHER HEMORRHOIDS: ICD-10-CM

## 2021-07-19 PROCEDURE — 99213 OFFICE O/P EST LOW 20 MIN: CPT | Performed by: INTERNAL MEDICINE

## 2021-07-19 NOTE — PROGRESS NOTES
Chief Complaint   Patient presents with   • Constipation   • Irritable Bowel Syndrome       Subjective     HPI    Kennedy Meyer is a 30 y.o. male with a past medical history noted below who presents for constipation, abdominal pain, hemorrhoids.  He has seen Dr. Jojo Allen for his hemorrhoids and no surgery was recommended.    BMs are daily and easy to pass.  No blood in his stool.  Does have mucus in the stool.  Some cramping with BMs but resolve after the BM.  He tried metamucil but did not like the consistency.  Is buying an online fiber and probiotic supplement from a nutrition company that he says works very well and dissolves completely in the liquid.  With this, he is having daily bowel movements.    He did use the 72 mcg samples of Linzess while he was waiting to get his fiber filled.    Denies heartburn or reflux.  He is eating and drinking well.    Not gotten the Covid vaccine.  We discussed that today.      Today's visit was in the office.  Both the patient and I were wearing face masks and proper hand hygiene was performed before and after the physical exam.           Current Outpatient Medications:   •  Hydrocortisone, Perianal, (Anusol-HC) 2.5 % rectal cream, Apply rectally 3 times daily.  Include applicator., Disp: 30 g, Rfl: 1  •  Multiple Vitamins-Minerals (CENTRUM MEN PO), Take  by mouth., Disp: , Rfl:   •  Omega-3 1000 MG capsule, Take 1 capsule by mouth Daily., Disp: , Rfl:   •  Probiotic Product (PROBIOTIC DAILY PO), Take  by mouth., Disp: , Rfl:   •  vitamin D (ERGOCALCIFEROL) 1.25 MG (54359 UT) capsule capsule, Take 1 capsule by mouth 1 (One) Time Per Week., Disp: 12 capsule, Rfl: 1      Objective     Vitals:    07/19/21 1004   BP: 130/72         07/19/21  1004   Weight: 116 kg (255 lb)     Body mass index is 37.66 kg/m².    Physical Exam  Constitutional:       General: He is not in acute distress.  Pulmonary:      Effort: Pulmonary effort is normal.   Abdominal:      Palpations: Abdomen is  soft.      Tenderness: There is no abdominal tenderness.   Neurological:      Mental Status: He is alert and oriented to person, place, and time.   Psychiatric:         Mood and Affect: Mood normal.         Behavior: Behavior normal.         Thought Content: Thought content normal.         Judgment: Judgment normal.             WBC   Date Value Ref Range Status   01/25/2021 7.77 3.40 - 10.80 10*3/mm3 Final   06/07/2020 9.98 4.5 - 11.0 10*3/uL Final     RBC   Date Value Ref Range Status   01/25/2021 4.45 4.14 - 5.80 10*6/mm3 Final   06/07/2020 4.32 (L) 4.5 - 5.9 10*6/uL Final     Hemoglobin   Date Value Ref Range Status   01/25/2021 15.4 13.0 - 17.7 g/dL Final   06/07/2020 14.9 13.5 - 17.5 g/dL Final     Hematocrit   Date Value Ref Range Status   01/25/2021 43.5 37.5 - 51.0 % Final   06/07/2020 42.3 41.0 - 53.0 % Final     MCV   Date Value Ref Range Status   01/25/2021 97.8 (H) 79.0 - 97.0 fL Final   06/07/2020 97.9 80.0 - 100.0 fL Final     MCH   Date Value Ref Range Status   01/25/2021 34.6 (H) 26.6 - 33.0 pg Final   06/07/2020 34.5 (H) 26.0 - 34.0 pg Final     MCHC   Date Value Ref Range Status   01/25/2021 35.4 31.5 - 35.7 g/dL Final   06/07/2020 35.2 31.0 - 37.0 g/dL Final     RDW   Date Value Ref Range Status   01/25/2021 11.5 (L) 12.3 - 15.4 % Final   06/07/2020 11.9 (L) 12.0 - 16.8 % Final     MPV   Date Value Ref Range Status   06/07/2020 11.6 (H) 6.7 - 10.8 fL Final     Platelets   Date Value Ref Range Status   01/25/2021 231 140 - 450 10*3/mm3 Final   06/07/2020 219 140 - 440 10*3/uL Final     Neutrophil Rel %   Date Value Ref Range Status   06/07/2020 79.1 45 - 80 % Final     Lymphocyte Rel %   Date Value Ref Range Status   06/07/2020 16.0 15 - 50 % Final     Monocyte Rel %   Date Value Ref Range Status   06/07/2020 4.0 0 - 15 % Final     Eosinophil %   Date Value Ref Range Status   06/07/2020 0.3 0 - 7 % Final     Basophil Rel %   Date Value Ref Range Status   06/07/2020 0.3 0 - 2 % Final     Immature  Grans %   Date Value Ref Range Status   06/07/2020 0.3 (H) 0 % Final     Neutrophils Absolute   Date Value Ref Range Status   06/07/2020 7.89 2.0 - 8.8 10*3/uL Final     Lymphocytes Absolute   Date Value Ref Range Status   06/07/2020 1.60 0.7 - 5.5 10*3/uL Final     Monocytes Absolute   Date Value Ref Range Status   06/07/2020 0.40 0.0 - 1.7 10*3/uL Final     Eosinophils Absolute   Date Value Ref Range Status   06/07/2020 0.03 0.0 - 0.8 10*3/uL Final     Basophils Absolute   Date Value Ref Range Status   06/07/2020 0.03 0.0 - 0.2 10*3/uL Final     Immature Grans, Absolute   Date Value Ref Range Status   06/07/2020 0.03 <1 10*3/uL Final     nRBC   Date Value Ref Range Status   06/07/2020 0 0 /100(WBC) Final       Lab Results   Component Value Date    BUN 11 01/25/2021    CREATININE 0.76 01/25/2021    EGFRIFNONA 119 01/25/2021    EGFRIFAFRI 144 01/25/2021    BCR 14.5 01/25/2021    CO2 23.1 01/25/2021    CALCIUM 9.1 01/25/2021    PROTENTOTREF 6.5 01/25/2021    ALBUMIN 4.30 01/25/2021    LABIL2 2.0 01/25/2021    AST 23 01/25/2021    ALT 27 01/25/2021         Imaging Results (Last 7 Days)     ** No results found for the last 168 hours. **          I personally reviewed data as detailed below:       No notes on file    Assessment/Plan     Diagnoses and all orders for this visit:    1. Chronic constipation (Primary)    2. Other hemorrhoids    3. Irritable bowel syndrome with constipation    4. Adenomatous polyp of colon, unspecified part of colon    Plan  Continue the fiber supplementation  Monitor stools  He is in recall for colonoscopy in 2025 based on his adenomatous colon polyp, we discussed that  I have given him some samples of 290 mcg of Linzess (this is all we have an office at this time) in case he does have more issues with constipation.  Cautioned regarding the higher dose.  He verbalizes understanding  I encouraged him to get the COVID-19 vaccine as he has not done so yet    Over 20 minutes was spent on this  office visit, including time spent reviewing his chart prior to his visit, during office visit, and and completion of the note following his visit.    I have discussed the above plan with the patient.  They verbalize understanding and are in agreement with the plan.  They have been advised to contact the office for any questions, concerns, or changes related to their health.    Dictated utilizing Dragon dictation

## 2021-07-20 ENCOUNTER — PATIENT MESSAGE (OUTPATIENT)
Dept: INTERNAL MEDICINE | Facility: CLINIC | Age: 31
End: 2021-07-20

## 2021-07-20 NOTE — TELEPHONE ENCOUNTER
Yes, I can give him a call to schedule an appt for the Covid AB through [facility]. We don't need an order, he will get results directly from [facility].    Thanks

## 2021-07-20 NOTE — TELEPHONE ENCOUNTER
From: Kennedy Meyer  To: PETERSON Hein  Sent: 7/20/2021 5:16 AM EDT  Subject: Non-Urgent Medical Question    Good morning Anne, can you test me for COVID-19 antibodies?

## 2021-07-23 ENCOUNTER — IMMUNIZATION (OUTPATIENT)
Dept: VACCINE CLINIC | Facility: HOSPITAL | Age: 31
End: 2021-07-23

## 2021-07-23 PROCEDURE — 0001A: CPT | Performed by: INTERNAL MEDICINE

## 2021-07-23 PROCEDURE — 91300 HC SARSCOV02 VAC 30MCG/0.3ML IM: CPT | Performed by: INTERNAL MEDICINE

## 2021-07-28 ENCOUNTER — OFFICE VISIT (OUTPATIENT)
Dept: INTERNAL MEDICINE | Facility: CLINIC | Age: 31
End: 2021-07-28

## 2021-07-28 VITALS
SYSTOLIC BLOOD PRESSURE: 148 MMHG | HEART RATE: 66 BPM | OXYGEN SATURATION: 98 % | DIASTOLIC BLOOD PRESSURE: 94 MMHG | WEIGHT: 258 LBS | BODY MASS INDEX: 38.21 KG/M2 | HEIGHT: 69 IN | RESPIRATION RATE: 18 BRPM | TEMPERATURE: 98 F

## 2021-07-28 DIAGNOSIS — Z11.3 SCREENING FOR STD (SEXUALLY TRANSMITTED DISEASE): ICD-10-CM

## 2021-07-28 DIAGNOSIS — K59.09 CHRONIC CONSTIPATION: Chronic | ICD-10-CM

## 2021-07-28 DIAGNOSIS — E78.1 HYPERTRIGLYCERIDEMIA: Chronic | ICD-10-CM

## 2021-07-28 DIAGNOSIS — E66.09 EXOGENOUS OBESITY: Chronic | ICD-10-CM

## 2021-07-28 DIAGNOSIS — Z87.891 FORMER SMOKER: Chronic | ICD-10-CM

## 2021-07-28 DIAGNOSIS — Z00.00 HEALTHCARE MAINTENANCE: Chronic | ICD-10-CM

## 2021-07-28 DIAGNOSIS — E55.9 HYPOVITAMINOSIS D: Primary | Chronic | ICD-10-CM

## 2021-07-28 PROCEDURE — 99214 OFFICE O/P EST MOD 30 MIN: CPT | Performed by: NURSE PRACTITIONER

## 2021-07-28 NOTE — PROGRESS NOTES
"Chief Complaint  Follow-up (Pt presents here today for a 6 month follow up and for health maintenance.)    Subjective          Kennedy Meyer presents to [facility]  Patient presents for 6-month follow-up.  This is a 30-year-old male.    He has hypertriglyceridemia.  He did not start taking the fish oil as directed at last lab result.     He has a BMI of 38.1, 7 pound weight gain since February 2021.    He is a former smoker, quit in November 2020 with the help of Chantix.    He would like to be screened for STDs today.  Denies any recent changes in partners or unprotected sex.    Overall reports that he is feeling well and denies development of any other new issues today.      Objective   Vital Signs:   /94 (BP Location: Left arm, Patient Position: Sitting, Cuff Size: Large Adult)   Pulse 66   Temp 98 °F (36.7 °C)   Resp 18   Ht 175.3 cm (69\")   Wt 117 kg (258 lb)   SpO2 98%   BMI 38.10 kg/m²     Physical Exam  Vitals and nursing note reviewed.   Constitutional:       General: He is not in acute distress.     Appearance: Normal appearance. He is well-developed. He is obese. He is not ill-appearing, toxic-appearing or diaphoretic.   HENT:      Head: Normocephalic and atraumatic.      Right Ear: Tympanic membrane, ear canal and external ear normal.      Left Ear: Tympanic membrane, ear canal and external ear normal.   Eyes:      Pupils: Pupils are equal, round, and reactive to light.   Neck:      Vascular: No carotid bruit.   Cardiovascular:      Rate and Rhythm: Normal rate and regular rhythm.      Pulses: Normal pulses.      Heart sounds: Normal heart sounds.      Comments: No peripheral edema  Pulmonary:      Effort: Pulmonary effort is normal. No respiratory distress.      Breath sounds: Normal breath sounds. No stridor. No wheezing, rhonchi or rales.   Chest:      Chest wall: No tenderness.   Abdominal:      General: Bowel sounds are normal. There is no distension.     "  Palpations: Abdomen is soft. There is no mass.      Tenderness: There is no abdominal tenderness. There is no right CVA tenderness, left CVA tenderness, guarding or rebound.      Hernia: No hernia is present.   Musculoskeletal:         General: Normal range of motion.      Cervical back: Normal range of motion and neck supple. No rigidity or tenderness.   Lymphadenopathy:      Cervical: No cervical adenopathy.   Skin:     General: Skin is warm and dry.      Capillary Refill: Capillary refill takes less than 2 seconds.   Neurological:      General: No focal deficit present.      Mental Status: He is alert and oriented to person, place, and time. Mental status is at baseline.   Psychiatric:         Mood and Affect: Mood normal.         Behavior: Behavior normal.         Thought Content: Thought content normal.         Judgment: Judgment normal.        Result Review :   The following data was reviewed by: PETERSON Hein on 07/28/2021:  Common labs    Common Labsle 1/25/21 1/25/21 1/25/21    0826 0826 0826   Glucose  87    BUN  11    Creatinine  0.76    eGFR Non  Am  119    eGFR African Am  144    Sodium  137    Potassium  4.1    Chloride  105    Calcium  9.1    Total Protein  6.5    Albumin  4.30    Total Bilirubin  0.2    Alkaline Phosphatase  96    AST (SGOT)  23    ALT (SGPT)  27    WBC 7.77     Hemoglobin 15.4     Hematocrit 43.5     Platelets 231     Total Cholesterol   167   Triglycerides   245 (A)   HDL Cholesterol   34 (A)   LDL Cholesterol    92   (A) Abnormal value       Comments are available for some flowsheets but are not being displayed.           Current outpatient and discharge medications have been reconciled for the patient.  Reviewed by: PETERSON Hein           Assessment and Plan    Diagnoses and all orders for this visit:    1. Hypovitaminosis D (Primary)  Assessment & Plan:  Continue with multivitamin.  Repeat vitamin D level today we will adjust therapy if  needed.    Orders:  -     Vitamin D 25 hydroxy    2. Exogenous obesity  Assessment & Plan:  Patient's (Body mass index is 38.1 kg/m².) Exercise level is recommended at 30 min per day 5 days per week.  Well-balanced diet is recommended.    Orders:  -     CBC No Differential  -     Comprehensive metabolic panel  -     Lipid panel  -     TSH Rfx On Abnormal To Free T4    3. Healthcare maintenance  Assessment & Plan:  Regular exercise goal, 30 minutes/day 5 days/week.      4. Hypertriglyceridemia  Assessment & Plan:  Continue lowering intake of fats in the diet, carbs and sugars.  Regular exercise.  Repeat lipid panel today and we will adjust therapy if needed.    Orders:  -     CBC No Differential  -     Comprehensive metabolic panel  -     Lipid panel  -     TSH Rfx On Abnormal To Free T4    5. Former smoker  Assessment & Plan:  Quit as of November 2020, continue with complete cessation.      6. Screening for STD (sexually transmitted disease)  Comments:  STD panel is ordered.  Orders:  -     Chlamydia trachomatis, Neisseria gonorrhoeae, PCR - Urine, Urine, Clean Catch  -     Hepatitis C RNA, quantitative, PCR (graph)  -     RPR  -     HSV 1 and 2-Specific Ab, IgG  -     HIV-1/O/2 ANTIGEN/ANTIBODY, 4TH GENERATION    7. Chronic constipation  Assessment & Plan:  Following with GI, Dr. Dumont.  He is taking Linzess, samples are provided.      We will contact patient with lab results and any further recommendations.  Follow-up as needed and I will see him back in 6 months for a physical.    Follow Up   Return in about 6 months (around 1/28/2022) for Annual physical.  Patient was given instructions and counseling regarding his condition or for health maintenance advice. Please see specific information pulled into the AVS if appropriate.

## 2021-07-28 NOTE — ASSESSMENT & PLAN NOTE
Continue lowering intake of fats in the diet, carbs and sugars.  Regular exercise.  Repeat lipid panel today and we will adjust therapy if needed.

## 2021-07-28 NOTE — ASSESSMENT & PLAN NOTE
Patient's (Body mass index is 38.1 kg/m².) Exercise level is recommended at 30 min per day 5 days per week.  Well-balanced diet is recommended.

## 2021-07-30 LAB
25(OH)D3+25(OH)D2 SERPL-MCNC: 29.8 NG/ML (ref 30–100)
ALBUMIN SERPL-MCNC: 4.6 G/DL (ref 3.5–5.2)
ALBUMIN/GLOB SERPL: 2 G/DL
ALP SERPL-CCNC: 88 U/L (ref 39–117)
ALT SERPL-CCNC: 28 U/L (ref 1–41)
AST SERPL-CCNC: 25 U/L (ref 1–40)
BILIRUB SERPL-MCNC: 0.4 MG/DL (ref 0–1.2)
BUN SERPL-MCNC: 8 MG/DL (ref 6–20)
BUN/CREAT SERPL: 9.2 (ref 7–25)
C TRACH RRNA SPEC QL NAA+PROBE: NEGATIVE
CALCIUM SERPL-MCNC: 9.4 MG/DL (ref 8.6–10.5)
CHLORIDE SERPL-SCNC: 107 MMOL/L (ref 98–107)
CHOLEST SERPL-MCNC: 173 MG/DL (ref 0–200)
CO2 SERPL-SCNC: 25.1 MMOL/L (ref 22–29)
CREAT SERPL-MCNC: 0.87 MG/DL (ref 0.76–1.27)
ERYTHROCYTE [DISTWIDTH] IN BLOOD BY AUTOMATED COUNT: 12 % (ref 12.3–15.4)
GLOBULIN SER CALC-MCNC: 2.3 GM/DL
GLUCOSE SERPL-MCNC: 90 MG/DL (ref 65–99)
HCT VFR BLD AUTO: 44.3 % (ref 37.5–51)
HCV RNA SERPL NAA+PROBE-ACNC: NORMAL IU/ML
HDLC SERPL-MCNC: 35 MG/DL (ref 40–60)
HGB BLD-MCNC: 15.3 G/DL (ref 13–17.7)
HIV 1+2 AB+HIV1 P24 AG SERPL QL IA: NON REACTIVE
HSV1 IGG SER IA-ACNC: 3.68 INDEX (ref 0–0.9)
HSV2 IGG SER IA-ACNC: 1.19 INDEX (ref 0–0.9)
HSV2 IGG SERPL QL IA: NEGATIVE
LDLC SERPL CALC-MCNC: 121 MG/DL (ref 0–100)
MCH RBC QN AUTO: 33.6 PG (ref 26.6–33)
MCHC RBC AUTO-ENTMCNC: 34.5 G/DL (ref 31.5–35.7)
MCV RBC AUTO: 97.4 FL (ref 79–97)
N GONORRHOEA RRNA SPEC QL NAA+PROBE: NEGATIVE
PLATELET # BLD AUTO: 223 10*3/MM3 (ref 140–450)
POTASSIUM SERPL-SCNC: 4.3 MMOL/L (ref 3.5–5.2)
PROT SERPL-MCNC: 6.9 G/DL (ref 6–8.5)
RBC # BLD AUTO: 4.55 10*6/MM3 (ref 4.14–5.8)
RPR SER QL: NORMAL
SODIUM SERPL-SCNC: 143 MMOL/L (ref 136–145)
TEST INFORMATION: NORMAL
TRIGL SERPL-MCNC: 92 MG/DL (ref 0–150)
TSH SERPL DL<=0.005 MIU/L-ACNC: 1.46 UIU/ML (ref 0.27–4.2)
VLDLC SERPL CALC-MCNC: 17 MG/DL (ref 5–40)
WBC # BLD AUTO: 7.33 10*3/MM3 (ref 3.4–10.8)

## 2021-08-13 ENCOUNTER — IMMUNIZATION (OUTPATIENT)
Dept: VACCINE CLINIC | Facility: HOSPITAL | Age: 31
End: 2021-08-13

## 2021-08-13 PROCEDURE — 0002A: CPT | Performed by: INTERNAL MEDICINE

## 2021-08-13 PROCEDURE — 91300 HC SARSCOV02 VAC 30MCG/0.3ML IM: CPT | Performed by: INTERNAL MEDICINE

## 2021-11-08 ENCOUNTER — TELEPHONE (OUTPATIENT)
Dept: GASTROENTEROLOGY | Facility: CLINIC | Age: 31
End: 2021-11-08

## 2021-11-08 NOTE — TELEPHONE ENCOUNTER
He needs to get miralax and take 1 full dose twice daily and can increase to 2 full doses twice daily if needed.  Continue fiber.      He will need to submit [facility] paperwork if needed

## 2021-11-08 NOTE — TELEPHONE ENCOUNTER
Call to pt.  Advise will send request to DR Dumont.     States because insurance changed, can no longer afford linzess ($550).  Therefore, using fiber only and this does not work as well as linzess.  Must strain to pass stool.  Has occasional hemorrhoidal flares - sates tossed and turned all night last night 2nd discomfort.     States is not asking off for days off/month - just that sometimes takes a few hours in the morning to get under control.

## 2021-11-08 NOTE — TELEPHONE ENCOUNTER
----- Message from Kennedy Meyer sent at 11/8/2021  1:21 PM EST -----  Regarding: Fmla  Good afternoon doctor macie, is there a way your office can fill out fmla for me, for my hemorrhoid flare ups? please.

## 2021-11-15 ENCOUNTER — TELEPHONE (OUTPATIENT)
Dept: GASTROENTEROLOGY | Facility: CLINIC | Age: 31
End: 2021-11-15

## 2021-11-15 NOTE — TELEPHONE ENCOUNTER
----- Message from Kennedy Meyer sent at 11/15/2021  3:04 PM EST -----  Regarding: Casie Dumont. Her nurse called me week before last saying she’ll fill out and return.

## 2021-11-17 NOTE — TELEPHONE ENCOUNTER
Called patient [phone] spoke with patient and informed him FMLA we received was 4 pages on one page and was too tiny to read, and not useable.  Asked patient to refax papers to [phone].  Patient voiced understanding.

## 2021-11-17 NOTE — TELEPHONE ENCOUNTER
Returned patient call.  Spoke with patient regarding FMLA.  He is asking for FMLA to be renewed.  Advised patient we do not have FMLA papers for him.  Asked patient to fax FMLA to [phone]

## 2021-11-17 NOTE — TELEPHONE ENCOUNTER
Called patient at [phone] to clarify patient needs for FMLA; he already has FMLA on file with our office.  There was no answer; not able to leave voice mail.

## 2021-11-22 NOTE — TELEPHONE ENCOUNTER
Spoke with patient and informed him Dr. Dumont  Is not okay with giving 3 days a month for FMLA, she is willing to give 3 hours a month.  Patient stated that this is absolutely not okay and requested to have an appointment with Dr. Dumont to discuss his needs.

## 2021-12-08 ENCOUNTER — OFFICE VISIT (OUTPATIENT)
Dept: GASTROENTEROLOGY | Facility: CLINIC | Age: 31
End: 2021-12-08

## 2021-12-08 VITALS
HEIGHT: 69 IN | DIASTOLIC BLOOD PRESSURE: 88 MMHG | SYSTOLIC BLOOD PRESSURE: 145 MMHG | BODY MASS INDEX: 39.99 KG/M2 | WEIGHT: 270 LBS

## 2021-12-08 DIAGNOSIS — R10.84 GENERALIZED ABDOMINAL PAIN: ICD-10-CM

## 2021-12-08 DIAGNOSIS — K59.09 CHRONIC CONSTIPATION: Primary | Chronic | ICD-10-CM

## 2021-12-08 DIAGNOSIS — K64.8 INTERNAL HEMORRHOIDS: ICD-10-CM

## 2021-12-08 PROCEDURE — 99214 OFFICE O/P EST MOD 30 MIN: CPT | Performed by: INTERNAL MEDICINE

## 2021-12-08 NOTE — PROGRESS NOTES
"Chief Complaint   Patient presents with   • Constipation   • Hemorrhoids       Subjective     HPI    Kennedy Meyer is a 31 y.o. male with a past medical history noted below who presents for follow-up.  He has a history of chronic constipation as well as hemorrhoids.    He was last seen in July and was doing well with an over-the-counter fiber supplementation reporting normal bowel movements and no significant issues.    However, for the past 4 weeks, he has really struggled.  He has been getting constipated and developing abdominal pain that is severe.  It is cramping and sharp.  He finds the only thing that helps him is if he relaxes for several hours.  He was requesting [facility] for this but he simply needs to be adequately treated.    Still taking fiber.  Tried miralax but he says all it did was because his abdomen to \"bubble.\"  The only thing that has worked for him is in Linzess.  However, he changed to a high deductible insurance at the beginning of this year and has not been able to afford the Linzess.  He has been using samples that I gave him previously sparingly.    Weight is up.  Eating and drinking well.    He is getting a new insurance for 2022 but will not take effect until early January    Today's visit was in the office.  Both the patient and I were wearing face masks and proper hand hygiene was performed before and after the physical exam.           Current Outpatient Medications:   •  Hydrocortisone, Perianal, (Anusol-HC) 2.5 % rectal cream, Apply rectally 3 times daily.  Include applicator., Disp: 30 g, Rfl: 1  •  Multiple Vitamins-Minerals (CENTRUM MEN PO), Take  by mouth., Disp: , Rfl:   •  Omega-3 1000 MG capsule, Take 1 capsule by mouth Daily., Disp: , Rfl:   •  Probiotic Product (PROBIOTIC DAILY PO), Take  by mouth., Disp: , Rfl:   •  psyllium (METAMUCIL) 58.6 % packet, Take 1 packet by mouth Daily., Disp: , Rfl:   •  linaclotide (Linzess) 72 MCG capsule capsule, Take 1 capsule by mouth Every " Morning Before Breakfast., Disp: 90 capsule, Rfl: 1      Objective     Vitals:    12/08/21 1517   BP: 145/88         12/08/21  1517   Weight: 122 kg (270 lb)     Body mass index is 39.87 kg/m².    Physical Exam  Constitutional:       General: He is not in acute distress.  Pulmonary:      Effort: Pulmonary effort is normal.   Neurological:      Mental Status: He is alert and oriented to person, place, and time.   Psychiatric:         Mood and Affect: Mood normal.         Behavior: Behavior normal.         Thought Content: Thought content normal.         Judgment: Judgment normal.             WBC   Date Value Ref Range Status   07/28/2021 7.33 3.40 - 10.80 10*3/mm3 Final   06/07/2020 9.98 4.5 - 11.0 10*3/uL Final     RBC   Date Value Ref Range Status   07/28/2021 4.55 4.14 - 5.80 10*6/mm3 Final   06/07/2020 4.32 (L) 4.5 - 5.9 10*6/uL Final     Hemoglobin   Date Value Ref Range Status   07/28/2021 15.3 13.0 - 17.7 g/dL Final   06/07/2020 14.9 13.5 - 17.5 g/dL Final     Hematocrit   Date Value Ref Range Status   07/28/2021 44.3 37.5 - 51.0 % Final   06/07/2020 42.3 41.0 - 53.0 % Final     MCV   Date Value Ref Range Status   07/28/2021 97.4 (H) 79.0 - 97.0 fL Final   06/07/2020 97.9 80.0 - 100.0 fL Final     MCH   Date Value Ref Range Status   07/28/2021 33.6 (H) 26.6 - 33.0 pg Final   06/07/2020 34.5 (H) 26.0 - 34.0 pg Final     MCHC   Date Value Ref Range Status   07/28/2021 34.5 31.5 - 35.7 g/dL Final   06/07/2020 35.2 31.0 - 37.0 g/dL Final     RDW   Date Value Ref Range Status   07/28/2021 12.0 (L) 12.3 - 15.4 % Final   06/07/2020 11.9 (L) 12.0 - 16.8 % Final     MPV   Date Value Ref Range Status   06/07/2020 11.6 (H) 6.7 - 10.8 fL Final     Platelets   Date Value Ref Range Status   07/28/2021 223 140 - 450 10*3/mm3 Final   06/07/2020 219 140 - 440 10*3/uL Final     Neutrophil Rel %   Date Value Ref Range Status   06/07/2020 79.1 45 - 80 % Final     Lymphocyte Rel %   Date Value Ref Range Status   06/07/2020 16.0 15  - 50 % Final     Monocyte Rel %   Date Value Ref Range Status   06/07/2020 4.0 0 - 15 % Final     Eosinophil %   Date Value Ref Range Status   06/07/2020 0.3 0 - 7 % Final     Basophil Rel %   Date Value Ref Range Status   06/07/2020 0.3 0 - 2 % Final     Immature Grans %   Date Value Ref Range Status   06/07/2020 0.3 (H) 0 % Final     Neutrophils Absolute   Date Value Ref Range Status   06/07/2020 7.89 2.0 - 8.8 10*3/uL Final     Lymphocytes Absolute   Date Value Ref Range Status   06/07/2020 1.60 0.7 - 5.5 10*3/uL Final     Monocytes Absolute   Date Value Ref Range Status   06/07/2020 0.40 0.0 - 1.7 10*3/uL Final     Eosinophils Absolute   Date Value Ref Range Status   06/07/2020 0.03 0.0 - 0.8 10*3/uL Final     Basophils Absolute   Date Value Ref Range Status   06/07/2020 0.03 0.0 - 0.2 10*3/uL Final     Immature Grans, Absolute   Date Value Ref Range Status   06/07/2020 0.03 <1 10*3/uL Final     nRBC   Date Value Ref Range Status   06/07/2020 0 0 /100(WBC) Final       Lab Results   Component Value Date    GLUCOSE 90 07/28/2021    BUN 8 07/28/2021    CREATININE 0.87 07/28/2021    EGFRIFNONA 102 07/28/2021    EGFRIFAFRI 123 07/28/2021    BCR 9.2 07/28/2021    CO2 25.1 07/28/2021    CALCIUM 9.4 07/28/2021    PROTENTOTREF 6.9 07/28/2021    ALBUMIN 4.60 07/28/2021    LABIL2 2.0 07/28/2021    AST 25 07/28/2021    ALT 28 07/28/2021         Imaging Results (Last 7 Days)     ** No results found for the last 168 hours. **          I personally reviewed data as detailed below:     The labs listed above.    Office notes from: 7/28/2021 PCP note      No notes on file    Assessment/Plan    1.  Chronic constipation: Undertreated and driving a lot of his issues.  Work-up is included CT imaging and colonoscopy.  Has struggled to afford Linzess due to high deductible insurance    2.  Generalized abdominal pain: With above    3.  Internal hemorrhoids: Worsened with above, he has seen  for this issue    Plan  I have given  him samples of Linzess to get him through until January.  I have also sent him a prescription for him to have filled starting January so that he can get on the Linzess.  I think his pain will significantly improved if we can get his bowels to move regularly.  I also want him to continue the fiber and healthy diet.    Follow-up in 3 to 4 months, sooner if needed.        Diagnoses and all orders for this visit:    1. Chronic constipation (Primary)    2. Generalized abdominal pain    3. Internal hemorrhoids    Other orders  -     linaclotide (Linzess) 72 MCG capsule capsule; Take 1 capsule by mouth Every Morning Before Breakfast.  Dispense: 90 capsule; Refill: 1        I have discussed the above plan with the patient.  They verbalize understanding and are in agreement with the plan.  They have been advised to contact the office for any questions, concerns, or changes related to their health.    Dictated utilizing Dragon dictation

## 2021-12-21 ENCOUNTER — APPOINTMENT (OUTPATIENT)
Dept: VACCINE CLINIC | Facility: HOSPITAL | Age: 31
End: 2021-12-21

## 2022-02-07 ENCOUNTER — TELEPHONE (OUTPATIENT)
Dept: GASTROENTEROLOGY | Facility: CLINIC | Age: 32
End: 2022-02-07

## 2022-02-07 NOTE — TELEPHONE ENCOUNTER
----- Message from Kennedy Meyer sent at 2/7/2022  8:24 AM EST -----  Regarding: Rx  Can you send prescription for 72mg linzess to my pharmacy, please.

## 2022-03-21 ENCOUNTER — TELEPHONE (OUTPATIENT)
Dept: GASTROENTEROLOGY | Facility: CLINIC | Age: 32
End: 2022-03-21

## 2022-03-21 NOTE — TELEPHONE ENCOUNTER
----- Message from Kennedy Meyer sent at 3/21/2022  1:53 PM EDT -----  Regarding: Rx  Hey, can you send another please.     Can you send prescription for 72mg linzess to my pharmacy, please.     **See linzess 72 mcg daily rx of 2/7/22 for #90, R1 to [facility].   Call to pt  Advise of same.  Verb understanding.  States did not get that rx filled.  Advise contact [facility] to fill.  Notify office if any issues.  Verb understanding.  Jackie Munoz RN.

## 2022-04-04 ENCOUNTER — TELEPHONE (OUTPATIENT)
Dept: GASTROENTEROLOGY | Facility: CLINIC | Age: 32
End: 2022-04-04

## 2022-04-04 NOTE — TELEPHONE ENCOUNTER
----- Message from Kennedy Meyer sent at 4/4/2022  9:46 AM EDT -----  Regarding: Pharmacy  I need tk change pharmacies. Please remind me Monday.

## 2022-04-11 ENCOUNTER — OFFICE VISIT (OUTPATIENT)
Dept: GASTROENTEROLOGY | Facility: CLINIC | Age: 32
End: 2022-04-11

## 2022-04-11 VITALS
SYSTOLIC BLOOD PRESSURE: 145 MMHG | DIASTOLIC BLOOD PRESSURE: 82 MMHG | BODY MASS INDEX: 40.29 KG/M2 | HEIGHT: 69 IN | WEIGHT: 272 LBS

## 2022-04-11 DIAGNOSIS — R10.84 GENERALIZED ABDOMINAL PAIN: Chronic | ICD-10-CM

## 2022-04-11 DIAGNOSIS — D12.6 ADENOMATOUS POLYP OF COLON, UNSPECIFIED PART OF COLON: ICD-10-CM

## 2022-04-11 DIAGNOSIS — K64.8 INTERNAL HEMORRHOIDS: Chronic | ICD-10-CM

## 2022-04-11 DIAGNOSIS — K59.09 CHRONIC CONSTIPATION: Primary | Chronic | ICD-10-CM

## 2022-04-11 PROCEDURE — 99214 OFFICE O/P EST MOD 30 MIN: CPT | Performed by: INTERNAL MEDICINE

## 2022-04-11 NOTE — PROGRESS NOTES
"Chief Complaint   Patient presents with   • Abdominal Pain   • Constipation   • Hemorrhoids       Subjective     HPI    Kennedy Meyre is a 31 y.o. male with a past medical history noted below who presents for follow-up of chronic constipation, internal hemorrhoids, and generalized abdominal pain.  He has been prescribed Linzess.    He has been taking linzess for about the past month and BMs \"are cool.\"    No issues with abdominal pain as long as his bowels are moving.      Changing jobs and likely will end up being without insurance for short period of time and needing to transition.-- he is working on a food truck business.      Request to change to a new pharmacy    Colonoscopy for symptoms 7/2020, 1 tubular adenoma    Today's visit was in the office.  Both the patient and I were wearing face masks and proper hand hygiene was performed before and after the physical exam.           Current Outpatient Medications:   •  Hydrocortisone, Perianal, (Anusol-HC) 2.5 % rectal cream, Apply rectally 3 times daily.  Include applicator., Disp: 30 g, Rfl: 1  •  linaclotide (Linzess) 72 MCG capsule capsule, Take 1 capsule by mouth Every Morning Before Breakfast., Disp: 90 capsule, Rfl: 1  •  Multiple Vitamins-Minerals (CENTRUM MEN PO), Take  by mouth., Disp: , Rfl:   •  Omega-3 1000 MG capsule, Take 1 capsule by mouth Daily., Disp: , Rfl:   •  Probiotic Product (PROBIOTIC DAILY PO), Take  by mouth., Disp: , Rfl:   •  psyllium (METAMUCIL) 58.6 % packet, Take 1 packet by mouth Daily., Disp: , Rfl:       Objective     Vitals:    04/11/22 0747   BP: 145/82         04/11/22  0747   Weight: 123 kg (272 lb)     Body mass index is 40.17 kg/m².    Physical Exam  Constitutional:       General: He is not in acute distress.  Pulmonary:      Effort: Pulmonary effort is normal.   Neurological:      Mental Status: He is alert and oriented to person, place, and time.   Psychiatric:         Mood and Affect: Mood normal.         Behavior: " Behavior normal.         Thought Content: Thought content normal.         Judgment: Judgment normal.             WBC   Date Value Ref Range Status   07/28/2021 7.33 3.40 - 10.80 10*3/mm3 Final   06/07/2020 9.98 4.5 - 11.0 10*3/uL Final     RBC   Date Value Ref Range Status   07/28/2021 4.55 4.14 - 5.80 10*6/mm3 Final   06/07/2020 4.32 (L) 4.5 - 5.9 10*6/uL Final     Hemoglobin   Date Value Ref Range Status   07/28/2021 15.3 13.0 - 17.7 g/dL Final   06/07/2020 14.9 13.5 - 17.5 g/dL Final     Hematocrit   Date Value Ref Range Status   07/28/2021 44.3 37.5 - 51.0 % Final   06/07/2020 42.3 41.0 - 53.0 % Final     MCV   Date Value Ref Range Status   07/28/2021 97.4 (H) 79.0 - 97.0 fL Final   06/07/2020 97.9 80.0 - 100.0 fL Final     MCH   Date Value Ref Range Status   07/28/2021 33.6 (H) 26.6 - 33.0 pg Final   06/07/2020 34.5 (H) 26.0 - 34.0 pg Final     MCHC   Date Value Ref Range Status   07/28/2021 34.5 31.5 - 35.7 g/dL Final   06/07/2020 35.2 31.0 - 37.0 g/dL Final     RDW   Date Value Ref Range Status   07/28/2021 12.0 (L) 12.3 - 15.4 % Final   06/07/2020 11.9 (L) 12.0 - 16.8 % Final     MPV   Date Value Ref Range Status   06/07/2020 11.6 (H) 6.7 - 10.8 fL Final     Platelets   Date Value Ref Range Status   07/28/2021 223 140 - 450 10*3/mm3 Final   06/07/2020 219 140 - 440 10*3/uL Final     Neutrophil Rel %   Date Value Ref Range Status   06/07/2020 79.1 45 - 80 % Final     Lymphocyte Rel %   Date Value Ref Range Status   06/07/2020 16.0 15 - 50 % Final     Monocyte Rel %   Date Value Ref Range Status   06/07/2020 4.0 0 - 15 % Final     Eosinophil %   Date Value Ref Range Status   06/07/2020 0.3 0 - 7 % Final     Basophil Rel %   Date Value Ref Range Status   06/07/2020 0.3 0 - 2 % Final     Immature Grans %   Date Value Ref Range Status   06/07/2020 0.3 (H) 0 % Final     Neutrophils Absolute   Date Value Ref Range Status   06/07/2020 7.89 2.0 - 8.8 10*3/uL Final     Lymphocytes Absolute   Date Value Ref Range  Status   06/07/2020 1.60 0.7 - 5.5 10*3/uL Final     Monocytes Absolute   Date Value Ref Range Status   06/07/2020 0.40 0.0 - 1.7 10*3/uL Final     Eosinophils Absolute   Date Value Ref Range Status   06/07/2020 0.03 0.0 - 0.8 10*3/uL Final     Basophils Absolute   Date Value Ref Range Status   06/07/2020 0.03 0.0 - 0.2 10*3/uL Final     Immature Grans, Absolute   Date Value Ref Range Status   06/07/2020 0.03 <1 10*3/uL Final     nRBC   Date Value Ref Range Status   06/07/2020 0 0 /100(WBC) Final       Lab Results   Component Value Date    GLUCOSE 90 07/28/2021    BUN 8 07/28/2021    CREATININE 0.87 07/28/2021    EGFRIFNONA 102 07/28/2021    EGFRIFAFRI 123 07/28/2021    BCR 9.2 07/28/2021    CO2 25.1 07/28/2021    CALCIUM 9.4 07/28/2021    PROTENTOTREF 6.9 07/28/2021    ALBUMIN 4.60 07/28/2021    LABIL2 2.0 07/28/2021    AST 25 07/28/2021    ALT 28 07/28/2021         Imaging Results (Last 7 Days)     ** No results found for the last 168 hours. **                 Assessment/Plan    1. Chronic constipation: Stable on Linzess    2. Generalized abdominal pain: With above, improved on Linzess    3. Internal hemorrhoids: He has seen  in the past    4.  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 Chronic constipation (Primary)    2. Generalized abdominal pain    3. Internal hemorrhoids    4. Adenomatous polyp of colon, unspecified part of colon    Other orders  -     linaclotide (Linzess) 72 MCG capsule capsule; Take 1 capsule by mouth Every Morning Before  Breakfast.  Dispense: 90 capsule; Refill: 1        I have discussed the above plan with the patient.  They verbalize understanding and are in agreement with the plan.  They have been advised to contact the office for any questions, concerns, or changes related to their health.    Dictated utilizing Dragon dictation

## 2022-06-02 ENCOUNTER — TELEPHONE (OUTPATIENT)
Dept: GASTROENTEROLOGY | Facility: CLINIC | Age: 32
End: 2022-06-02

## 2022-06-02 NOTE — TELEPHONE ENCOUNTER
----- Message from Kennedy Meyer sent at 6/1/2022  5:47 PM EDT -----  Regarding: Prescription   Can ya send [facility] script [facility] linzess for me please.

## 2022-10-11 ENCOUNTER — OFFICE VISIT (OUTPATIENT)
Dept: GASTROENTEROLOGY | Facility: CLINIC | Age: 32
End: 2022-10-11

## 2022-10-11 VITALS
SYSTOLIC BLOOD PRESSURE: 129 MMHG | WEIGHT: 254.2 LBS | BODY MASS INDEX: 35.59 KG/M2 | DIASTOLIC BLOOD PRESSURE: 82 MMHG | HEIGHT: 71 IN | TEMPERATURE: 97.1 F

## 2022-10-11 DIAGNOSIS — K59.09 CHRONIC CONSTIPATION: Primary | ICD-10-CM

## 2022-10-11 PROCEDURE — 99213 OFFICE O/P EST LOW 20 MIN: CPT | Performed by: PHYSICIAN ASSISTANT

## 2023-01-23 RX ORDER — HYDROCORTISONE 25 MG/G
CREAM TOPICAL
Qty: 30 G | Refills: 1 | OUTPATIENT
Start: 2023-01-23

## (undated) DEVICE — THE TORRENT IRRIGATION SCOPE CONNECTOR IS USED WITH THE TORRENT IRRIGATION TUBING TO PROVIDE IRRIGATION FLUIDS SUCH AS STERILE WATER DURING GASTROINTESTINAL ENDOSCOPIC PROCEDURES WHEN USED IN CONJUNCTION WITH AN IRRIGATION PUMP (OR ELECTROSURGICAL UNIT).: Brand: TORRENT

## (undated) DEVICE — KT ORCA ORCAPOD DISP STRL

## (undated) DEVICE — TUBING, SUCTION, 1/4" X 10', STRAIGHT: Brand: MEDLINE

## (undated) DEVICE — ADAPT CLN BIOGUARD AIR/H2O DISP

## (undated) DEVICE — SINGLE-USE BIOPSY FORCEPS: Brand: RADIAL JAW 4

## (undated) DEVICE — CANN O2 ETCO2 FITS ALL CONN CO2 SMPL A/ 7IN DISP LF

## (undated) DEVICE — SENSR O2 OXIMAX FNGR A/ 18IN NONSTR